# Patient Record
Sex: FEMALE | Race: OTHER | Employment: FULL TIME | URBAN - METROPOLITAN AREA
[De-identification: names, ages, dates, MRNs, and addresses within clinical notes are randomized per-mention and may not be internally consistent; named-entity substitution may affect disease eponyms.]

---

## 2024-10-03 ENCOUNTER — TELEPHONE (OUTPATIENT)
Age: 39
End: 2024-10-03

## 2024-10-03 NOTE — TELEPHONE ENCOUNTER
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

## 2024-10-04 ENCOUNTER — ROUTINE PRENATAL (OUTPATIENT)
Dept: OBGYN CLINIC | Facility: CLINIC | Age: 39
End: 2024-10-04
Payer: COMMERCIAL

## 2024-10-04 VITALS
SYSTOLIC BLOOD PRESSURE: 108 MMHG | BODY MASS INDEX: 24.38 KG/M2 | WEIGHT: 142.8 LBS | DIASTOLIC BLOOD PRESSURE: 60 MMHG | HEIGHT: 64 IN

## 2024-10-04 DIAGNOSIS — Z3A.30 30 WEEKS GESTATION OF PREGNANCY: ICD-10-CM

## 2024-10-04 DIAGNOSIS — O09.513 PRIMIGRAVIDA OF ADVANCED MATERNAL AGE IN THIRD TRIMESTER: ICD-10-CM

## 2024-10-04 DIAGNOSIS — Z23 ENCOUNTER FOR IMMUNIZATION: Primary | ICD-10-CM

## 2024-10-04 PROCEDURE — 90715 TDAP VACCINE 7 YRS/> IM: CPT | Performed by: STUDENT IN AN ORGANIZED HEALTH CARE EDUCATION/TRAINING PROGRAM

## 2024-10-04 PROCEDURE — 90471 IMMUNIZATION ADMIN: CPT | Performed by: STUDENT IN AN ORGANIZED HEALTH CARE EDUCATION/TRAINING PROGRAM

## 2024-10-04 PROCEDURE — PNV: Performed by: STUDENT IN AN ORGANIZED HEALTH CARE EDUCATION/TRAINING PROGRAM

## 2024-10-04 NOTE — PROGRESS NOTES
Assessment/Plan  Problem List Items Addressed This Visit       30 weeks gestation of pregnancy       Veronica presents today for her OB visit.  She is currently 30w2d    Vitals:    10/04/24 1417   BP: 108/60       She received her prenatal care in [facility].  Has records with her today, ultrasounds reviewed and scanned into chart.  Dated by first trimester ultrasound- RAGHU . Reports no complications this pregnancy.  Reports she completed  genetic testing but does not have results for review. Has first tri labs for review on phone.   Blood type O+  Urine cx neg   Neg HIV testing   Neg hep B antigen   Normal TSH       Flu vaccine completed prior     Pap smear and gonorrhea and chlamydia testing collected at this visit         Relevant Orders    Ambulatory Referral to Maternal Fetal Medicine    Type and screen    CBC and differential    RPR-Syphilis Screening (Total Syphilis IGG/IGM)    HIV 1/2 AG/AB w Reflex [facility] for 2 yr old and above    Liquid-based pap, screening    Chlamydia/GC amplified DNA by PCR    Urine culture    Glucose, 1H PG    Primigravida of advanced maternal age in third trimester     Other Visit Diagnoses       Encounter for immunization    -  Primary    Relevant Orders    Tdap Vaccine greater than or equal to 8yo            Subjective    Veronica is a 39 y.o. female,  with an Estimated Date of Delivery: 24 via 1st tri US. Patient reports no complaints. Fetal movement: active.     She is a PIERO from [facility]. Has first tri blood work and US available for review.   Reports no complications thus far this pregnancy, has been receiving routine care   Denies any medical problems     History  The following portions of the patient's history were reviewed and updated as appropriate: allergies, current medications, past family history, past medical history, past social history, past surgical history and problem list.    Objective  Vitals:    10/04/24 1417   BP: 108/60     Breast + pelvic exam wnl   FHT:  145  Urine: neg/neg  Pap will need to be recollected at next visit

## 2024-10-04 NOTE — ASSESSMENT & PLAN NOTE
Veronica presents today for her OB visit.  She is currently 30w2d    Vitals:    10/04/24 1417   BP: 108/60       She received her prenatal care in [facility].  Has records with her today, ultrasounds reviewed and scanned into chart.  Dated by first trimester ultrasound- RAGHU 12/11. Reports no complications this pregnancy.  Reports she completed  genetic testing but does not have results for review. Has first tri labs for review on phone.   Blood type O+  Urine cx neg   Neg HIV testing   Neg hep B antigen   Normal TSH       Flu vaccine completed prior     Pap smear and gonorrhea and chlamydia testing collected at this visit

## 2024-10-06 ENCOUNTER — APPOINTMENT (OUTPATIENT)
Dept: LAB | Facility: CLINIC | Age: 39
End: 2024-10-06
Payer: COMMERCIAL

## 2024-10-06 DIAGNOSIS — Z3A.30 30 WEEKS GESTATION OF PREGNANCY: ICD-10-CM

## 2024-10-06 LAB
ABO GROUP BLD: NORMAL
BASOPHILS # BLD AUTO: 0.03 THOUSANDS/ΜL (ref 0–0.1)
BASOPHILS NFR BLD AUTO: 0 % (ref 0–1)
BLD GP AB SCN SERPL QL: NEGATIVE
EOSINOPHIL # BLD AUTO: 0.16 THOUSAND/ΜL (ref 0–0.61)
EOSINOPHIL NFR BLD AUTO: 2 % (ref 0–6)
ERYTHROCYTE [DISTWIDTH] IN BLOOD BY AUTOMATED COUNT: 12.4 % (ref 11.6–15.1)
GLUCOSE 1H P 50 G GLC PO SERPL-MCNC: 92 MG/DL (ref 70–134)
HCT VFR BLD AUTO: 34.1 % (ref 34.8–46.1)
HGB BLD-MCNC: 11.2 G/DL (ref 11.5–15.4)
HIV 1+2 AB+HIV1 P24 AG SERPL QL IA: NORMAL
HIV 2 AB SERPL QL IA: NORMAL
HIV1 AB SERPL QL IA: NORMAL
HIV1 P24 AG SERPL QL IA: NORMAL
IMM GRANULOCYTES # BLD AUTO: 0.06 THOUSAND/UL (ref 0–0.2)
IMM GRANULOCYTES NFR BLD AUTO: 1 % (ref 0–2)
LYMPHOCYTES # BLD AUTO: 1.36 THOUSANDS/ΜL (ref 0.6–4.47)
LYMPHOCYTES NFR BLD AUTO: 14 % (ref 14–44)
MCH RBC QN AUTO: 32.7 PG (ref 26.8–34.3)
MCHC RBC AUTO-ENTMCNC: 32.8 G/DL (ref 31.4–37.4)
MCV RBC AUTO: 100 FL (ref 82–98)
MONOCYTES # BLD AUTO: 0.48 THOUSAND/ΜL (ref 0.17–1.22)
MONOCYTES NFR BLD AUTO: 5 % (ref 4–12)
NEUTROPHILS # BLD AUTO: 7.94 THOUSANDS/ΜL (ref 1.85–7.62)
NEUTS SEG NFR BLD AUTO: 78 % (ref 43–75)
NRBC BLD AUTO-RTO: 0 /100 WBCS
PLATELET # BLD AUTO: 221 THOUSANDS/UL (ref 149–390)
PMV BLD AUTO: 9 FL (ref 8.9–12.7)
RBC # BLD AUTO: 3.42 MILLION/UL (ref 3.81–5.12)
RH BLD: POSITIVE
SPECIMEN EXPIRATION DATE: NORMAL
TREPONEMA PALLIDUM IGG+IGM AB [PRESENCE] IN SERUM OR PLASMA BY IMMUNOASSAY: NORMAL
WBC # BLD AUTO: 10.03 THOUSAND/UL (ref 4.31–10.16)

## 2024-10-06 PROCEDURE — 87086 URINE CULTURE/COLONY COUNT: CPT

## 2024-10-06 PROCEDURE — 82950 GLUCOSE TEST: CPT

## 2024-10-06 PROCEDURE — 86901 BLOOD TYPING SEROLOGIC RH(D): CPT

## 2024-10-06 PROCEDURE — 86780 TREPONEMA PALLIDUM: CPT

## 2024-10-06 PROCEDURE — 86900 BLOOD TYPING SEROLOGIC ABO: CPT

## 2024-10-06 PROCEDURE — 87389 HIV-1 AG W/HIV-1&-2 AB AG IA: CPT

## 2024-10-06 PROCEDURE — 36415 COLL VENOUS BLD VENIPUNCTURE: CPT

## 2024-10-06 PROCEDURE — 85025 COMPLETE CBC W/AUTO DIFF WBC: CPT

## 2024-10-06 PROCEDURE — 86850 RBC ANTIBODY SCREEN: CPT

## 2024-10-07 DIAGNOSIS — Z3A.30 30 WEEKS GESTATION OF PREGNANCY: Primary | ICD-10-CM

## 2024-10-08 LAB — BACTERIA UR CULT: NORMAL

## 2024-10-14 NOTE — PROGRESS NOTES
OB/GYN  PN Visit  Veronica Pozo  [id number]  10/15/2024  2:15 PM  ELIE Carlin    S: 39 y.o.  31w6d here for PN visit. Pregnancy complicated by AMA and late transfer of care.     OB complaints:  Denies c/o n/v/ha, no edema, no smoking, no DV.   No vb/lof  No cramping/ctxns or signs of PTL.    She reports good FM. She reports vaginal discharge that has increased. Described as thick milk like substance. No odor.    O:    Pre- Vitals      Flowsheet Row Most Recent Value   Prenatal Assessment    Fetal Heart Rate 150   Fundal Height (cm) 31 cm   Movement Present   Prenatal Vitals    Blood Pressure 108/62   Weight - Scale 64.9 kg (143 lb)   Urine Albumin/Glucose    Dilation/Effacement/Station    Vaginal Drainage    Edema             Physical Exam  Genitourinary:     General: Normal vulva.      Exam position: Lithotomy position.      Pubic Area: No rash or pubic lice.       Labia:         Right: No rash or tenderness.         Left: No rash or tenderness.       Urethra: No urethral pain.      Vagina: Normal.      Cervix: Normal.      Uterus: Normal.       Adnexa: Right adnexa normal and left adnexa normal.        Right: No mass.          Left: No mass.     Lymphadenopathy:      Lower Body: No right inguinal adenopathy. No left inguinal adenopathy.         Gen: no acute distress, nonlabored breathing.  OB exam completed: fundal height, +FHT.  Urine: -/-    A/P:  Problem List Items Addressed This Visit       Primigravida of advanced maternal age in third trimester - Primary     Other Visit Diagnoses       Prenatal care in third trimester        Relevant Orders    Liquid-based pap, screening    Chlamydia/GC amplified DNA by PCR    Trichomonas vaginalis Thin prep    Genital Comprehensive Culture          #1. 31w6d GESTATION  Labor precautions reviewed  Fetal kick counts reviewed  Tdap: 10/4/24  Flu: has received this season in [place].  Rhogam: n/a  Blood type: O+  MFM  Oct 18.  Advised to complete hepatitis  blood work. She believes she had it done in [place], will review records.   Breastfeeding: has pump   Pediatrician: referral given  Contraception: NFP and condoms  GBS: at 36 weeks    - Third trimester packet provided and reviewed:   - Birth room rules and acknowledgement, birth plan, authorization for release of protected health information for photographers and birth certificate/SS worksheet to be brought to hospital at time of delivery.   -Delivery Consent signed today.    - FKC - 10 in 2 hours  -Perineal Massages to start at 34 weeks  -Last week of pregnancy and when to call      RTC in 2 weeks    ELIE Carlin  10/15/2024  2:15 PM

## 2024-10-15 ENCOUNTER — ROUTINE PRENATAL (OUTPATIENT)
Dept: OBGYN CLINIC | Facility: CLINIC | Age: 39
End: 2024-10-15

## 2024-10-15 VITALS
SYSTOLIC BLOOD PRESSURE: 108 MMHG | HEIGHT: 64 IN | WEIGHT: 143 LBS | BODY MASS INDEX: 24.41 KG/M2 | DIASTOLIC BLOOD PRESSURE: 62 MMHG

## 2024-10-15 DIAGNOSIS — O09.513 PRIMIGRAVIDA OF ADVANCED MATERNAL AGE IN THIRD TRIMESTER: Primary | ICD-10-CM

## 2024-10-15 DIAGNOSIS — Z34.93 PRENATAL CARE IN THIRD TRIMESTER: ICD-10-CM

## 2024-10-15 PROCEDURE — 87491 CHLMYD TRACH DNA AMP PROBE: CPT

## 2024-10-15 PROCEDURE — 87661 TRICHOMONAS VAGINALIS AMPLIF: CPT

## 2024-10-15 PROCEDURE — G0476 HPV COMBO ASSAY CA SCREEN: HCPCS

## 2024-10-15 PROCEDURE — 87070 CULTURE OTHR SPECIMN AEROBIC: CPT

## 2024-10-15 PROCEDURE — G0145 SCR C/V CYTO,THINLAYER,RESCR: HCPCS

## 2024-10-15 PROCEDURE — 87591 N.GONORRHOEAE DNA AMP PROB: CPT

## 2024-10-15 PROCEDURE — PNV

## 2024-10-16 ENCOUNTER — INITIAL PRENATAL (OUTPATIENT)
Dept: OBGYN CLINIC | Facility: CLINIC | Age: 39
End: 2024-10-16

## 2024-10-16 VITALS — BODY MASS INDEX: 24.48 KG/M2 | WEIGHT: 143.4 LBS | HEIGHT: 64 IN

## 2024-10-16 DIAGNOSIS — D56.9 THALASSEMIA, UNSPECIFIED TYPE: ICD-10-CM

## 2024-10-16 DIAGNOSIS — Z36.89 ENCOUNTER FOR OTHER SPECIFIED ANTENATAL SCREENING: Primary | ICD-10-CM

## 2024-10-16 DIAGNOSIS — Z34.03 ENCOUNTER FOR SUPERVISION OF NORMAL FIRST PREGNANCY IN THIRD TRIMESTER: ICD-10-CM

## 2024-10-16 PROBLEM — Z3A.32 32 WEEKS GESTATION OF PREGNANCY: Status: ACTIVE | Noted: 2024-10-04

## 2024-10-16 LAB
HPV HR 12 DNA CVX QL NAA+PROBE: NEGATIVE
HPV16 DNA CVX QL NAA+PROBE: NEGATIVE
HPV18 DNA CVX QL NAA+PROBE: NEGATIVE

## 2024-10-16 PROCEDURE — NOBC

## 2024-10-16 NOTE — PROGRESS NOTES
OB INTAKE INTERVIEW  Patient is 39 y.o. who presents for OB intake at 32 wks - TRANSFER FROM [facility]  She is accompanied by Giuliano during this encounter  The father of her baby (Giuliano Atkinson) is involved in the pregnancy and is ___ years old.      Last Menstrual Period: 3/11/2024  Ultrasound: Measured 12 weeks 5 days on 6/3/2024, patient also had US on 2024 c/w EDC 2024 (patient had US records)  Estimated Date of Delivery: 2024 confirmed by US    Signs/Symptoms of Pregnancy  Current pregnancy symptoms: (+) FM - discussed FKC, urinary frequency  Constipation no  Headaches no  Cramping/spotting no  PICA cravings no    Diabetes-  There is no height or weight on file to calculate BMI.  If patient has 1 or more, please order early 1 hour GTT  History of GDM no  BMI >35 no  History of PCOS or current metformin use no  History of LGA/macrosomic infant (4000g/9lbs) no    If patient has 2 or more, please order early 1 hour GTT  BMI>30 no  AMA YES  First degree relative with type 2 diabetes no  History of chronic HTN, hyperlipidemia, elevated A1C no  High risk race (, , ,  or ) YES    Hypertension- if you answer yes to any of the following, please order baseline preeclampsia labs (cbc, comprehensive metabolic panel, urine protein creatinine ratio, uric acid)  History of of chronic HTN no  History of gestational HTN no  History of preeclampsia, eclampsia, or HELLP syndrome no  History of diabetes no  History of lupus,sjogrens syndrome, kidney disease no    Thyroid- if yes order TSH with reflex T4  History of thyroid disease no    Bleeding Disorder or Hx of DVT-patient or first degree relative with history of. Order the following if not done previously.   (Factor V, antithrombin III, prothrombin gene mutation, protein C and S Ag, lupus anticoagulant, anticardiolipin, beta-2 glycoprotein)   no    OB/GYN-  History of abnormal pap  smear no       Date of last pap smear 10/15/2024  History of HPV no  History of Herpes/HSV no  History of other STI (gonorrhea, chlamydia, trich) no  History of prior  no  History of prior  no  History of  delivery prior to 36 weeks 6 days no  History of Varicella or Vaccination patient had chickenpox + vaccine  History of blood transfusion no  Ok for blood transfusion YES    Substance screening-   History of tobacco use no  Currently using tobacco no  Substance Use Screen Level (N/A, LOW, HIGH) N/A    MRSA Screening-   Does the pt have a hx of MRSA? no    Immunizations:  Influenza vaccine given this season YES  Discussed Tdap vaccine YES - had TDAP vaccine 10/4/2024  Discussed COVID Vaccine - had Covid vaccine x 2 + 1 booster, pt had Covid x 1     Genetic/MFM-  Do you or your partner have a history of any of the following in yourselves or first degree relatives?  Cystic fibrosis no  Spinal muscular atrophy no  Hemoglobinopathy/Sickle Cell/Thalassemia no  Fragile X Intellectual Disability no    If yes, discuss Carrier Screening and recommend consultation with [facility]/Genetic Counseling and place specific [facility] Referral for.    If no, discuss Carrier Screening being completed once in a lifetime as a standard of care lab test. Place orders for Cystic Fibrosis Gene Test (HHV759) and Spinal Muscular Atrophy DNA ([id number]) - discussed - patient will decide & inform if wants lab order      Appointment for Nuchal Translucency Ultrasound at [facility] scheduled for - has US appointment 10/18/2024 - had NT & cell free dna (wnl) & US wk 16, 21, 28      Interview education  [facility] Pregnancy Essentials Book reviewed, discussed and attached to their AVS YES    Nurse/Family Partnership- patient may qualify; referral placed NO    Prenatal lab work scripts - patient previously had labs done - active orders in chart for HBSAG & Hep C Ab  Extra labs ordered:  Hgb fractionation cascade, Rubella    Aspirin/Preeclampsia  Screen    Risk Level Risk Factor Recommendation   LOW Prior Uncomplicated full-term delivery no No Aspirin recommendation        MODERATE Nulliparity YES Recommend low-dose aspirin if     BMI>30 no 2 or more moderate risk factors    Family History Preeclampsia (mother/sister) no     35yr old or greater YES     Black Race, Concern for SDOH/Low Socioeconomic no     IVF Pregnancy  no     Personal History Risks (low birth weight, prior adverse preg outcome, >10yr preg interval) no         HIGH History of Preeclampsia no Recommend low-dose aspirin if     Multifetal gestation no 1 or more high risk factors    Chronic HTN no     Type 1 or 2 Diabetes no     Renal Disease no     Autoimmune Disease  no      Contraindications to ASA therapy:  NSAID/ ASA allergy: no  Nasal polyps: no  Asthma with history of ASA induced bronchospasm: no  Relative contraindications:  History of GI bleed: no  Active peptic ulcer disease: no  Severe hepatic dysfunction: no    Patient should be recommended to take ASA 162mg during this pregnancy from 12-36wks to lower her risk of preeclampsia: not taking LDASA      The patient has a history now or in prior pregnancy notable for:  - transfer from [facility] @ 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 The patient was oriented to our practice, the navigator role, reviewed delivering physicians and [facility] for Delivery. All questions were answered.    Interviewed by: MARYANN Meraz, RN

## 2024-10-17 LAB
C TRACH DNA SPEC QL NAA+PROBE: NEGATIVE
N GONORRHOEA DNA SPEC QL NAA+PROBE: NEGATIVE
T VAGINALIS DNA SPEC QL NAA+PROBE: NEGATIVE

## 2024-10-18 ENCOUNTER — ROUTINE PRENATAL (OUTPATIENT)
Dept: PERINATAL CARE | Facility: CLINIC | Age: 39
End: 2024-10-18
Payer: COMMERCIAL

## 2024-10-18 VITALS
SYSTOLIC BLOOD PRESSURE: 106 MMHG | HEART RATE: 84 BPM | WEIGHT: 143.8 LBS | DIASTOLIC BLOOD PRESSURE: 60 MMHG | HEIGHT: 64 IN | OXYGEN SATURATION: 98 % | BODY MASS INDEX: 24.55 KG/M2

## 2024-10-18 DIAGNOSIS — O09.513 PRIMIGRAVIDA OF ADVANCED MATERNAL AGE IN THIRD TRIMESTER: Primary | ICD-10-CM

## 2024-10-18 DIAGNOSIS — Z3A.30 30 WEEKS GESTATION OF PREGNANCY: ICD-10-CM

## 2024-10-18 LAB — BACTERIA GENITAL AEROBE CULT: NORMAL

## 2024-10-18 PROCEDURE — 99203 OFFICE O/P NEW LOW 30 MIN: CPT | Performed by: OBSTETRICS & GYNECOLOGY

## 2024-10-18 NOTE — LETTER
"   Date: 10/18/2024    Daily Wilkins MD  [address] [id number]    Patient: Veronica Pozo   YOB: 1985   Date of Visit: 10/18/2024   Gestational age 32w2d   Nature of this communication: Priority: Patient is scheduled to see you 10/30/24 for a prenatal visit. I recommended weekly NST+TEJINDER to begin between 32-36 weeks and continue through delivery. She wants to complete this in your office if possible. Can you please address at her upcoming visit, or have her schedule with M if needed? I recommend delivery by due date for AMA. Thanks!       This patient was seen recently in our  office.  Please see ultrasound report under \"OB Procedures\" tab.  Please don't hesitate to contact our office with any concerns or questions.      Sincerely,      Sachi Mac MD  Attending Physician, Maternal-Fetal Medicine  [facility]    "

## 2024-10-18 NOTE — LETTER
"2024     Luz Burrell MD  [address] [id number]    Patient: Veronica Pozo   YOB: 1985   Date of Visit: 10/18/2024       Dear Dr. Burrell:    Thank you for referring Veronica Pozo to me for evaluation. Below are my notes for this consultation.    If you have questions, please do not hesitate to call me. I look forward to following your patient along with you.         Sincerely,        Sachi Mac MD        CC: No Recipients    Sachi Mac MD  10/18/2024  9:30 AM  Sign when Signing Visit  [facility]: Ms. Pozo was seen today at 32w2d for anatomic survey ultrasound.  See ultrasound report under \"OB Procedures\" tab.      My recommendations are as follows:  Although encouraging, even a normal-appearing ultrasound cannot exclude all malformations, or the possibility of a genetic syndrome.  No further ultrasounds were scheduled at this time. Please refer her back to our office as clinically indicated.      Based on maternal age I recommend weekly antepartum surveillance (NST+TEJINDER) to begin between 32 and 36 weeks and continue until delivery, in addition to kick counting. She prefers that this be completed with [facility] if possible. Delivery is recommended by her due date.      Please don't hesitate to contact our office with any concerns or questions.    -Sachi Mac MD  "

## 2024-10-18 NOTE — PROGRESS NOTES
"[facility]: Ms. Pozo was seen today at 32w2d for anatomic survey ultrasound.  See ultrasound report under \"OB Procedures\" tab.      My recommendations are as follows:  Although encouraging, even a normal-appearing ultrasound cannot exclude all malformations, or the possibility of a genetic syndrome.  No further ultrasounds were scheduled at this time. Please refer her back to our office as clinically indicated.      Based on maternal age I recommend weekly antepartum surveillance (NST+TEJINDER) to begin between 32 and 36 weeks and continue until delivery, in addition to kick counting. She prefers that this be completed with [facility] if possible. Delivery is recommended by her due date.      Please don't hesitate to contact our office with any concerns or questions.    -Sachi Mac MD  "

## 2024-10-22 LAB
LAB AP GYN PRIMARY INTERPRETATION: NORMAL
LAB AP LMP: NORMAL
Lab: NORMAL

## 2024-10-30 ENCOUNTER — ROUTINE PRENATAL (OUTPATIENT)
Dept: OBGYN CLINIC | Facility: CLINIC | Age: 39
End: 2024-10-30
Payer: COMMERCIAL

## 2024-10-30 VITALS
SYSTOLIC BLOOD PRESSURE: 100 MMHG | HEIGHT: 64 IN | BODY MASS INDEX: 24.62 KG/M2 | WEIGHT: 144.2 LBS | DIASTOLIC BLOOD PRESSURE: 60 MMHG

## 2024-10-30 DIAGNOSIS — Z29.11 NEED FOR RSV IMMUNIZATION: ICD-10-CM

## 2024-10-30 DIAGNOSIS — Z34.93 PRENATAL CARE IN THIRD TRIMESTER: Primary | ICD-10-CM

## 2024-10-30 PROCEDURE — PNV: Performed by: OBSTETRICS & GYNECOLOGY

## 2024-10-30 PROCEDURE — 90678 RSV VACC PREF BIVALENT IM: CPT | Performed by: OBSTETRICS & GYNECOLOGY

## 2024-10-30 PROCEDURE — 90471 IMMUNIZATION ADMIN: CPT | Performed by: OBSTETRICS & GYNECOLOGY

## 2024-10-30 NOTE — PROGRESS NOTES
Patient reports good fm, no n/v, headache, cramping, bleeding, loss of fluid, edema, dom violence, or smoking.  nancy pnv, urine negative negative  -Had Tdap and flu, RSV today counseled  -Will schedule antepartum fetal surveillance weekly NST TEJINDER for AMA  -Pediatrician has referral  -has breastpump  -has yellow Pack  -Contraception: NFP and condoms  Return in 1 week for nst/tejinder

## 2024-11-06 ENCOUNTER — CLINICAL SUPPORT (OUTPATIENT)
Dept: POSTPARTUM | Facility: CLINIC | Age: 39
End: 2024-11-06

## 2024-11-06 DIAGNOSIS — Z32.2 ENCOUNTER FOR CHILDBIRTH INSTRUCTION: Primary | ICD-10-CM

## 2024-11-12 ENCOUNTER — ULTRASOUND (OUTPATIENT)
Dept: OBGYN CLINIC | Facility: CLINIC | Age: 39
End: 2024-11-12

## 2024-11-12 VITALS
DIASTOLIC BLOOD PRESSURE: 60 MMHG | HEIGHT: 64 IN | BODY MASS INDEX: 25.16 KG/M2 | SYSTOLIC BLOOD PRESSURE: 100 MMHG | WEIGHT: 147.4 LBS

## 2024-11-12 DIAGNOSIS — Z3A.35 35 WEEKS GESTATION OF PREGNANCY: Primary | ICD-10-CM

## 2024-11-12 PROCEDURE — PNV: Performed by: STUDENT IN AN ORGANIZED HEALTH CARE EDUCATION/TRAINING PROGRAM

## 2024-11-12 NOTE — PROGRESS NOTES
Veronica is a 39 y.o.  35w6d. Reports ++FM, no LOF, VB, or regular contractions.     Vitals:    24 1300   BP: 100/60     S=D  +FHTs  TAUS: VERTEX, TEJINDER 11  Assessment & Plan  35 weeks gestation of pregnancy  Rh status POS  Growth 10/18/2024: VERTEX, EFW Hadlock 4 2048 grams - 4 lbs 8 oz (55%), AC 68th%ile  TDAP: received  RSV: received  Peds: TBD  Contraception:  Discussed pre-term labor precautions  Return to office in 1 weeks         Multigravida of advanced maternal age in third trimester  APFS recommended starting at 32-36 weeks  Had been scheduled for NST+TEJINDER today, but was uncertain about recommendation, declined to be put on NST today  Reviewed that advanced maternal age confers higher risk of fetal distress and stillbirth and as such recommend NST and TEJINDER to capture any signs of distress  Indicate preference to start next week, unable to do today  TAUS 24: VERTEX, TEJINDER 11  No NST preformed due to patient preference

## 2024-11-13 ENCOUNTER — CLINICAL SUPPORT (OUTPATIENT)
Dept: POSTPARTUM | Facility: CLINIC | Age: 39
End: 2024-11-13

## 2024-11-13 DIAGNOSIS — Z32.2 ENCOUNTER FOR CHILDBIRTH INSTRUCTION: Primary | ICD-10-CM

## 2024-11-16 ENCOUNTER — CLINICAL SUPPORT (OUTPATIENT)
Age: 39
End: 2024-11-16

## 2024-11-16 DIAGNOSIS — Z32.2 ENCOUNTER FOR CHILDBIRTH INSTRUCTION: Primary | ICD-10-CM

## 2024-11-19 ENCOUNTER — ROUTINE PRENATAL (OUTPATIENT)
Dept: OBGYN CLINIC | Facility: CLINIC | Age: 39
End: 2024-11-19

## 2024-11-19 ENCOUNTER — ULTRASOUND (OUTPATIENT)
Dept: OBGYN CLINIC | Facility: CLINIC | Age: 39
End: 2024-11-19

## 2024-11-19 VITALS
HEART RATE: 86 BPM | BODY MASS INDEX: 25.3 KG/M2 | DIASTOLIC BLOOD PRESSURE: 60 MMHG | SYSTOLIC BLOOD PRESSURE: 110 MMHG | HEIGHT: 64 IN

## 2024-11-19 VITALS — BODY MASS INDEX: 25.3 KG/M2 | HEIGHT: 64 IN

## 2024-11-19 DIAGNOSIS — Z33.1 NORMAL PREGNANCY, INCIDENTAL: Primary | ICD-10-CM

## 2024-11-19 DIAGNOSIS — Z34.93 PRENATAL CARE IN THIRD TRIMESTER: Primary | ICD-10-CM

## 2024-11-19 PROCEDURE — PBNCHG PB NO CHARGE PLACEHOLDER: Performed by: OBSTETRICS & GYNECOLOGY

## 2024-11-19 NOTE — PROGRESS NOTES
Patient reports good fm, no n/v, headache, cramping, bleeding, loss of fluid, edema, dom violence, or smoking.  nancy pnv urine negative negative  -NST 20 minutes 140s with moderate variability and accelerations no decelerations toco irritability, TEJINDER 11.5  -Patient had Tdap, RSV, flu shot  -Had peds consult and yellow packet  -Contraception NFP and condoms  -Continue weekly NST TEJINDER  -GBS done today

## 2024-11-20 ENCOUNTER — CLINICAL SUPPORT (OUTPATIENT)
Dept: POSTPARTUM | Facility: CLINIC | Age: 39
End: 2024-11-20

## 2024-11-20 DIAGNOSIS — Z32.2 ENCOUNTER FOR CHILDBIRTH INSTRUCTION: Primary | ICD-10-CM

## 2024-11-26 ENCOUNTER — ROUTINE PRENATAL (OUTPATIENT)
Dept: OBGYN CLINIC | Facility: CLINIC | Age: 39
End: 2024-11-26
Payer: COMMERCIAL

## 2024-11-26 ENCOUNTER — ULTRASOUND (OUTPATIENT)
Dept: OBGYN CLINIC | Facility: CLINIC | Age: 39
End: 2024-11-26
Payer: COMMERCIAL

## 2024-11-26 VITALS
DIASTOLIC BLOOD PRESSURE: 56 MMHG | HEIGHT: 64 IN | WEIGHT: 149 LBS | BODY MASS INDEX: 25.44 KG/M2 | SYSTOLIC BLOOD PRESSURE: 112 MMHG

## 2024-11-26 DIAGNOSIS — O09.513 PRIMIGRAVIDA OF ADVANCED MATERNAL AGE IN THIRD TRIMESTER: Primary | ICD-10-CM

## 2024-11-26 DIAGNOSIS — Z33.1 NORMAL PREGNANCY, INCIDENTAL: Primary | ICD-10-CM

## 2024-11-26 PROCEDURE — 59025 FETAL NON-STRESS TEST: CPT | Performed by: STUDENT IN AN ORGANIZED HEALTH CARE EDUCATION/TRAINING PROGRAM

## 2024-11-26 PROCEDURE — PNV: Performed by: STUDENT IN AN ORGANIZED HEALTH CARE EDUCATION/TRAINING PROGRAM

## 2024-11-26 PROCEDURE — 76815 OB US LIMITED FETUS(S): CPT | Performed by: STUDENT IN AN ORGANIZED HEALTH CARE EDUCATION/TRAINING PROGRAM

## 2024-11-26 NOTE — PROGRESS NOTES
Veronica is a 40 yo  at 37 weeks and 6 days presenting for routine PNC- she denies CTX, LOF or VB. She endorses good FM. Urine neg/neg.  Pregnancy is complicated by AMA.  NST today in office was reactive with a baseline of, multiple 15x15 accelerations and no decelerations with irregular contractions on tocometer.   TEJINDER: 11.01 cm     We discussed recommendations for delivery by due date and as early as 39 weeks for elective IOL.  Cervical team today is FT/ 50/-1, soft and posterior.  Labor talk completed- they do not have a car or car seat and we reviewed social work consultation and ability to rent a car seat to get their baby home.  RTO in 1 week

## 2024-12-03 ENCOUNTER — TELEPHONE (OUTPATIENT)
Dept: OBGYN CLINIC | Facility: CLINIC | Age: 39
End: 2024-12-03

## 2024-12-03 ENCOUNTER — ULTRASOUND (OUTPATIENT)
Dept: OBGYN CLINIC | Facility: CLINIC | Age: 39
End: 2024-12-03
Payer: COMMERCIAL

## 2024-12-03 VITALS — BODY MASS INDEX: 26.09 KG/M2 | SYSTOLIC BLOOD PRESSURE: 100 MMHG | WEIGHT: 152 LBS | DIASTOLIC BLOOD PRESSURE: 60 MMHG

## 2024-12-03 DIAGNOSIS — Z34.93 PRENATAL CARE IN THIRD TRIMESTER: Primary | ICD-10-CM

## 2024-12-03 PROCEDURE — 76815 OB US LIMITED FETUS(S): CPT | Performed by: OBSTETRICS & GYNECOLOGY

## 2024-12-03 PROCEDURE — 59025 FETAL NON-STRESS TEST: CPT | Performed by: OBSTETRICS & GYNECOLOGY

## 2024-12-03 PROCEDURE — PNV: Performed by: OBSTETRICS & GYNECOLOGY

## 2024-12-03 NOTE — TELEPHONE ENCOUNTER
----- Message from Daily Wilkins MD sent at 12/3/2024  1:39 PM EST -----  Regarding: IOL  Please schedule 2 day induction with 8 pm ripening 12/12 hill/cytotec and oxytocin next morning 12/13.  Pnc recommendation for AMA

## 2024-12-03 NOTE — TELEPHONE ENCOUNTER
[facility] message sent to Danisha SCHULER to check availability for IOL 12/12/2024 @ 8pm into 12/13/2024 - available & patient scheduled.  Pink sticky note updated.  LM patient's VM to recall office to inform of induction date & instructions.  Staff message sent to Dr Wilkins & Dr Christy.   Yes

## 2024-12-03 NOTE — PROGRESS NOTES
Patient reports good fm, no n/v, headache,  bleeding, loss of fluid, edema, dom violence, or smoking.  nancy pnv occasional cramping, some edema  -NST 20-minute 130s moderate variability with accelerations no decelerations  Snyder q 5 min contractions, patient not feeling  -Patient had Tdap, RSV, flu shot  -Had peds consult and yellow packet  -Weekly NST TEJINDER for AMA  -contraception NFP and condoms  -GBS negative, will need car seat and social work consult to assist at hospital  -Message sent to staff patient and  willing for IOL December 12 into the 13th.  Patient preferred to wait as long as possible to allow natural labor to hopefully occur  -Return in 1 week or sooner as needed

## 2024-12-10 ENCOUNTER — TELEPHONE (OUTPATIENT)
Age: 39
End: 2024-12-10

## 2024-12-10 ENCOUNTER — TELEPHONE (OUTPATIENT)
Dept: OBGYN CLINIC | Facility: CLINIC | Age: 39
End: 2024-12-10

## 2024-12-10 ENCOUNTER — ROUTINE PRENATAL (OUTPATIENT)
Dept: OBGYN CLINIC | Facility: CLINIC | Age: 39
End: 2024-12-10
Payer: COMMERCIAL

## 2024-12-10 ENCOUNTER — ULTRASOUND (OUTPATIENT)
Dept: OBGYN CLINIC | Facility: CLINIC | Age: 39
End: 2024-12-10
Payer: COMMERCIAL

## 2024-12-10 VITALS
WEIGHT: 152.8 LBS | SYSTOLIC BLOOD PRESSURE: 110 MMHG | HEART RATE: 78 BPM | HEIGHT: 64 IN | DIASTOLIC BLOOD PRESSURE: 64 MMHG | BODY MASS INDEX: 26.09 KG/M2

## 2024-12-10 DIAGNOSIS — O09.513 PRIMIGRAVIDA OF ADVANCED MATERNAL AGE IN THIRD TRIMESTER: ICD-10-CM

## 2024-12-10 DIAGNOSIS — Z34.93 PRENATAL CARE IN THIRD TRIMESTER: Primary | ICD-10-CM

## 2024-12-10 PROCEDURE — PNV: Performed by: OBSTETRICS & GYNECOLOGY

## 2024-12-10 PROCEDURE — 76815 OB US LIMITED FETUS(S): CPT | Performed by: OBSTETRICS & GYNECOLOGY

## 2024-12-10 PROCEDURE — 59025 FETAL NON-STRESS TEST: CPT | Performed by: OBSTETRICS & GYNECOLOGY

## 2024-12-10 PROCEDURE — PBNCHG PB NO CHARGE PLACEHOLDER: Performed by: OBSTETRICS & GYNECOLOGY

## 2024-12-10 NOTE — PROGRESS NOTES
Patient reports good fm, no n/v, headache,  bleeding, loss of fluid, edema, dom violence, or smoking.  nancy pnv some occasional cramping urine negative negative  -NST greater than 20 minutes 130s with moderate variability there was a paper jam and some monitoring was lost but no decelerations noted and accelerations were noted toco still with irritability patient not feeling  -TEJINDER 14 Vertex  Assessment & Plan  Prenatal care in third trimester  -Patient had Tdap, RSV, flu shot  -Had peds consult and yellow packet  -Contraception NFP and condoms  -Continue weekly NST TEJINDER  they do not have a car or car seat and we reviewed social work consultation and ability to rent a car seat to get their baby home.              Primigravida of advanced maternal age in third trimester  Weekly NST TEJINDER for AMA  Patient had IOL scheduled for December 12 into the 13th.  Patient would like to delay to 15th into the 16th.  We discussed that recommendations for AMA are by EDC we discussed that placenta continues to age on a day by day basis and that while monitoring is reassuring at this time that there is increased risk for baby to not tolerate labor as well as we continue to move forward to mature we discussed recommendation would be for delivery by EDC patient would like to look into availability of IOL for 15th end of the 16th with repeat monitoring on Friday.  Message sent to staff to evaluate.

## 2024-12-10 NOTE — TELEPHONE ENCOUNTER
Rescheduled patient's IOL to 12/15/2024 @ 8 pm into 12/16/2024.  Pink sticky note updated.  Patient informed.  Staff message sent to Dr Waggoner & Dr Burrell. Called thr Access center ([place]) - [facility] not answering - will send message re: appointment for NST &TEJINDER on 12/13/2024.  Patient updated.  Will recall patient 12/11/2024.

## 2024-12-10 NOTE — TELEPHONE ENCOUNTER
----- Message from Fidelia Waggoner MD sent at 12/10/2024  1:16 PM EST -----  Regarding: RE: not sure if you got mesg before about r/s IOL to 12/15 @ 8 pm into 12/16 - saw Dr Wilkins today. ok with you for 12/15 (Sun)  I am fine with it- saw she was counseled by Dr. Wilkins.  ----- Message -----  From: Audrey Meraz RN  Sent: 12/10/2024  12:54 PM EST  To: Fidelia Waggoner MD  Subject: not sure if you got mesg before about r/s IO#

## 2024-12-10 NOTE — TELEPHONE ENCOUNTER
Audrey from [facility] called in regards to getting the patient an NST/TEJINDER appointment for Friday 12/13/24  at the [place] location per Dr. Wilkins. You can reach Audrey at the [facility] tomorrow at   [phone]

## 2024-12-10 NOTE — ASSESSMENT & PLAN NOTE
Weekly NST TEJINDER for AMA  Patient had IOL scheduled for December 12 into the 13th.  Patient would like to delay to 15th into the 16th.  We discussed that recommendations for AMA are by EDC we discussed that placenta continues to age on a day by day basis and that while monitoring is reassuring at this time that there is increased risk for baby to not tolerate labor as well as we continue to move forward to mature we discussed recommendation would be for delivery by EDC patient would like to look into availability of IOL for 15th end of the 16th with repeat monitoring on Friday.  Message sent to staff to evaluate.

## 2024-12-11 ENCOUNTER — TELEPHONE (OUTPATIENT)
Dept: PERINATAL CARE | Facility: CLINIC | Age: 39
End: 2024-12-11

## 2024-12-11 NOTE — TELEPHONE ENCOUNTER
Called [facility] ([place] - [phone]) - spoke with Angela - scheduled NST & TEJINDER for 12/13/2024 @ 3:15 pm.  Left message patient's VM & MyChart message sent to patient.

## 2024-12-11 NOTE — TELEPHONE ENCOUNTER
received call from Audrey from [facility] to schedule patient for nst/naya due to iol being postponed until 12/15. miguelina 12/11. scheduled for 12/13 in [place] at 3:15pm

## 2024-12-11 NOTE — TELEPHONE ENCOUNTER
Patient's partner, Giuliano, called regarding appt info for MFM NST/ TEJINDER. Giuliano not listed on last 2 medical communication consent forms found on chart. Pt unavailable to s/w but will call back shortly to provide appt details.

## 2024-12-12 NOTE — PATIENT INSTRUCTIONS
Thank you for choosing us for your  care today.  If you have any questions about your ultrasound or care, please do not hesitate to contact us or your primary obstetrician.        Some general instructions for your pregnancy are:    Exercise: Aim for 150 minutes per week of regular exercise.  Walking is great!  Nutrition: Choose healthy sources of calcium, iron, and protein.  Avoid ultraprocessed foods and added sugar.  Learn about Preeclampsia: preeclampsia is a common, potentially serious high blood pressure complication in pregnancy.  A blood pressure of 140mmHg (systolic or top number) or 90mmHg (diastolic or bottom number) should be evaluated by your doctor.  Aspirin is sometimes prescribed in early pregnancy to prevent preeclampsia in women with risk factors - ask your obstetrician if you should be on this medication.  For more resources, visit:  https://www.highriskpregnancyinfo.org/preeclampsia  If you smoke, please try to quit completely but also try to reduce your smoking by as much as possible (as soon as possible).  Do not vape.  Please also avoid cannabis products.  Other warning signs to watch out for in pregnancy or postpartum: chest pain, obstructed breathing or shortness of breath, seizures, thoughts of hurting yourself or your baby, bleeding, a painful or swollen leg, fever, or headache ([place] [facility] Sign[place]  If these happen call 911.  Itching is also not normal in pregnancy and if you experience this, especially over your hands and feet, potentially worse at night, notify your doctors.     Kick Counts in Pregnancy   AMBULATORY CARE:   Kick counts  measure how much your baby is moving in your womb. A kick from your baby can be felt as a twist, turn, swish, roll, or jab. It is common to feel your baby kicking at 26 to 28 weeks of pregnancy. You may feel your baby kick as early as 20 weeks of pregnancy. You may want to start counting at 28 weeks.   Contact your  doctor immediately if:   You feel a change in the number of kicks or movements of your baby.      You feel fewer than 10 kicks within 2 hours.      You have questions or concerns about your baby's movements.     Why measure kick counts:  Your baby's movement may provide information about your baby's health. He or she may move less, or not at all, if there are problems. Your baby may move less if he or she is not getting enough oxygen or nutrition from the placenta. Do not smoke while you are pregnant. Smoking decreases the amount of oxygen that gets to your baby. Talk to your healthcare provider if you need help to quit smoking. Tell your healthcare provider as soon as you feel a change in your baby's movements.  When to measure kick counts:   Measure kick counts at the same time every day.       Measure kick counts when your baby is awake and most active. Your baby may be most active in the evening.     How to measure kick counts:  Check that your baby is awake before you measure kick counts. You can wake up your baby by lightly pushing on your belly, walking, or drinking something cold. Your healthcare provider may tell you different ways to measure kick counts. You may be told to do the following:  Use a chart or clock to keep track of the time you start and finish counting.      Sit in a chair or lie on your left side.      Place your hands on the largest part of your belly.      Count until you reach 10 kicks. Write down how much time it takes to count 10 kicks.      It may take 30 minutes to 2 hours to count 10 kicks. It should not take more than 2 hours to count 10 kicks.     Follow up with your doctor as directed:  Write down your questions so you remember to ask them during your visits.   © Copyright [employer] 2023 Information is for End User's use only and may not be sold, redistributed or otherwise used for commercial purposes.  The above information is an  only. It is not intended as  medical advice for individual conditions or treatments. Talk to your doctor, nurse or pharmacist before following any medical regimen to see if it is safe and effective for you. Nonstress Test for Pregnancy   WHAT YOU NEED TO KNOW:   What do I need to know about a nonstress test?  A nonstress test measures your baby's heart rate and movements. Nonstress means that no stress will be placed on your baby during the test.  How do I prepare for a nonstress test?  Your healthcare provider will talk to you about how to prepare for this test. Your provider may tell you to eat and drink plenty of liquids before your test. If you smoke, you may be asked not to smoke within 2 hours before the test. Your provider will also tell you which medicines to take or not take on the day of your test.  What will happen during a nonstress test?  You may be asked to lie down or recline back for the test on a bed. One or 2 belts with sensors will be placed around your abdomen. Your baby's heart rate will be recorded with a machine. If your baby does not move, your baby may be asleep. Your healthcare provider may make a noise near your abdomen to try to wake your baby. The test usually takes about 20 minutes, but can take longer if your baby needs to be awakened.        What do I need to know about the test results?  Your baby will be expected to move at least 2 times for a certain amount of time. Your baby's heart rate will be expected to go up by a certain number of beats per minute during movement. If your baby does not move as expected, the test may need to be repeated or you may need other tests.  CARE AGREEMENT:   You have the right to help plan your care. Learn about your health condition and how it may be treated. Discuss treatment options with your healthcare providers to decide what care you want to receive. You always have the right to refuse treatment. The above information is an  only. It is not intended as  medical advice for individual conditions or treatments. Talk to your doctor, nurse or pharmacist before following any medical regimen to see if it is safe and effective for you.  © 2023 Information is for End User's use only and may not be sold, redistributed or otherwise used for commercial purposes. Thank you for choosing us for your  care today.  If you have any questions about your ultrasound or care, please do not hesitate to contact us or your primary obstetrician.        Some general instructions for your pregnancy are:    Exercise: Aim for 150 minutes per week of regular exercise.  Walking is great!  Nutrition: Choose healthy sources of calcium, iron, and protein.  Avoid ultraprocessed foods and added sugar.  Learn about Preeclampsia: preeclampsia is a common, potentially serious high blood pressure complication in pregnancy.  A blood pressure of 140mmHg (systolic or top number) or 90mmHg (diastolic or bottom number) should be evaluated by your doctor.  Aspirin is sometimes prescribed in early pregnancy to prevent preeclampsia in women with risk factors - ask your obstetrician if you should be on this medication.  For more resources, visit:  https://www.highriskpregnancyinfo.org/preeclampsia  If you smoke, please try to quit completely but also try to reduce your smoking by as much as possible (as soon as possible).  Do not vape.  Please also avoid cannabis products.  Other warning signs to watch out for in pregnancy or postpartum: chest pain, obstructed breathing or shortness of breath, seizures, thoughts of hurting yourself or your baby, bleeding, a painful or swollen leg, fever, or headache (see [facility] POST-BIRTH Warning Signs campaign).  If these happen call 911.  Itching is also not normal in pregnancy and if you experience this, especially over your hands and feet, potentially worse at night, notify your doctors.

## 2024-12-13 ENCOUNTER — ULTRASOUND (OUTPATIENT)
Dept: PERINATAL CARE | Facility: CLINIC | Age: 39
End: 2024-12-13
Payer: COMMERCIAL

## 2024-12-13 VITALS
HEART RATE: 102 BPM | HEIGHT: 64 IN | BODY MASS INDEX: 26.12 KG/M2 | SYSTOLIC BLOOD PRESSURE: 120 MMHG | WEIGHT: 153 LBS | DIASTOLIC BLOOD PRESSURE: 72 MMHG

## 2024-12-13 DIAGNOSIS — O09.513 PRIMIGRAVIDA OF ADVANCED MATERNAL AGE IN THIRD TRIMESTER: ICD-10-CM

## 2024-12-13 DIAGNOSIS — Z3A.40 40 WEEKS GESTATION OF PREGNANCY: Primary | ICD-10-CM

## 2024-12-13 PROCEDURE — 59025 FETAL NON-STRESS TEST: CPT | Performed by: STUDENT IN AN ORGANIZED HEALTH CARE EDUCATION/TRAINING PROGRAM

## 2024-12-13 PROCEDURE — 76815 OB US LIMITED FETUS(S): CPT | Performed by: STUDENT IN AN ORGANIZED HEALTH CARE EDUCATION/TRAINING PROGRAM

## 2024-12-13 NOTE — PROGRESS NOTES
This patient received  care under my supervision on 24 at 40w2d gestational age at [facility].  NST is reactive.  -Delaney Beltran MD

## 2024-12-13 NOTE — PROGRESS NOTES
Non-Stress Testing:    Non-Stress test, equipment, procedure, and expected outcomes explained. Reviewed fetal kick counts and when to call OB.Verified patient understanding of fetal kick counts with teach back method. Patient reports feeling daily fetal movements. Patient has no questions or concerns.     Reviewed non-stress test with Dr. Beltran.

## 2024-12-13 NOTE — LETTER
NST sleeve cover sheet    Patient name: Veronica Pozo  : 1985  MRN: [id number]    RAGHU: Estimated Date of Delivery: 24    Obstetrician: GAYLE    Reason(s) for testing: AMA 38 yo    Testing frequency:    ___  2x/wk  _X__ 1x/wk  ___  Dopplers  ___  BPP?      Last growth scan: __________, _________, _________    Baby:      Male   /   Female   /   [place]             Baby Name: ___________________            IOL or  C/S: _____________________

## 2024-12-14 ENCOUNTER — TELEPHONE (OUTPATIENT)
Dept: OTHER | Facility: OTHER | Age: 39
End: 2024-12-14

## 2024-12-14 NOTE — TELEPHONE ENCOUNTER
Patient is calling regarding cancelling an appointment.    Date/Time: 12/15/2024 8:00 pm     Patient was rescheduled: YES [] NO [x]    Patient requesting call back to reschedule: YES [x] NO []    Paged on call VIA EPIC

## 2024-12-16 ENCOUNTER — ANESTHESIA (INPATIENT)
Dept: ANESTHESIOLOGY | Facility: HOSPITAL | Age: 39
End: 2024-12-16
Payer: COMMERCIAL

## 2024-12-16 ENCOUNTER — ANESTHESIA EVENT (INPATIENT)
Dept: ANESTHESIOLOGY | Facility: HOSPITAL | Age: 39
End: 2024-12-16
Payer: COMMERCIAL

## 2024-12-16 ENCOUNTER — HOSPITAL ENCOUNTER (INPATIENT)
Facility: HOSPITAL | Age: 39
LOS: 3 days | Discharge: HOME | End: 2024-12-19
Attending: STUDENT IN AN ORGANIZED HEALTH CARE EDUCATION/TRAINING PROGRAM | Admitting: STUDENT IN AN ORGANIZED HEALTH CARE EDUCATION/TRAINING PROGRAM
Payer: COMMERCIAL

## 2024-12-16 ENCOUNTER — ULTRASOUND (OUTPATIENT)
Dept: PERINATAL CARE | Facility: CLINIC | Age: 39
End: 2024-12-16
Payer: COMMERCIAL

## 2024-12-16 VITALS
SYSTOLIC BLOOD PRESSURE: 106 MMHG | WEIGHT: 154 LBS | BODY MASS INDEX: 26.29 KG/M2 | DIASTOLIC BLOOD PRESSURE: 80 MMHG | HEIGHT: 64 IN | HEART RATE: 64 BPM

## 2024-12-16 DIAGNOSIS — Z3A.40 40 WEEKS GESTATION OF PREGNANCY: ICD-10-CM

## 2024-12-16 DIAGNOSIS — O09.513 PRIMIGRAVIDA OF ADVANCED MATERNAL AGE IN THIRD TRIMESTER: ICD-10-CM

## 2024-12-16 DIAGNOSIS — O09.513 PRIMIGRAVIDA OF ADVANCED MATERNAL AGE IN THIRD TRIMESTER: Primary | ICD-10-CM

## 2024-12-16 PROBLEM — O42.90 LEAKAGE OF AMNIOTIC FLUID: Status: ACTIVE | Noted: 2024-12-16

## 2024-12-16 PROBLEM — Z34.90 ENCOUNTER FOR INDUCTION OF LABOR: Status: ACTIVE | Noted: 2024-12-16

## 2024-12-16 PROBLEM — O36.8390 NON-REASSURING ELECTRONIC FETAL MONITORING TRACING: Status: ACTIVE | Noted: 2024-12-16

## 2024-12-16 PROCEDURE — 76817 TRANSVAGINAL US OBSTETRIC: CPT | Performed by: OBSTETRICS & GYNECOLOGY

## 2024-12-16 PROCEDURE — 85025 COMPLETE CBC W/AUTO DIFF WBC: CPT

## 2024-12-16 PROCEDURE — 99214 OFFICE O/P EST MOD 30 MIN: CPT | Performed by: OBSTETRICS & GYNECOLOGY

## 2024-12-16 PROCEDURE — 86850 RBC ANTIBODY SCREEN: CPT

## 2024-12-16 PROCEDURE — 76815 OB US LIMITED FETUS(S): CPT | Performed by: STUDENT IN AN ORGANIZED HEALTH CARE EDUCATION/TRAINING PROGRAM

## 2024-12-16 PROCEDURE — 86803 HEPATITIS C AB TEST: CPT

## 2024-12-16 PROCEDURE — NC001 PR NO CHARGE: Performed by: STUDENT IN AN ORGANIZED HEALTH CARE EDUCATION/TRAINING PROGRAM

## 2024-12-16 PROCEDURE — 86706 HEP B SURFACE ANTIBODY: CPT

## 2024-12-16 PROCEDURE — 86780 TREPONEMA PALLIDUM: CPT

## 2024-12-16 PROCEDURE — 86901 BLOOD TYPING SEROLOGIC RH(D): CPT

## 2024-12-16 PROCEDURE — 87340 HEPATITIS B SURFACE AG IA: CPT

## 2024-12-16 PROCEDURE — 76815 OB US LIMITED FETUS(S): CPT | Performed by: OBSTETRICS & GYNECOLOGY

## 2024-12-16 PROCEDURE — 86762 RUBELLA ANTIBODY: CPT

## 2024-12-16 PROCEDURE — 86900 BLOOD TYPING SEROLOGIC ABO: CPT

## 2024-12-16 RX ORDER — ONDANSETRON 2 MG/ML
4 INJECTION INTRAMUSCULAR; INTRAVENOUS EVERY 6 HOURS PRN
Status: DISCONTINUED | OUTPATIENT
Start: 2024-12-16 | End: 2024-12-18

## 2024-12-16 RX ORDER — SODIUM CHLORIDE, SODIUM LACTATE, POTASSIUM CHLORIDE, CALCIUM CHLORIDE 600; 310; 30; 20 MG/100ML; MG/100ML; MG/100ML; MG/100ML
125 INJECTION, SOLUTION INTRAVENOUS CONTINUOUS
Status: DISCONTINUED | OUTPATIENT
Start: 2024-12-16 | End: 2024-12-18

## 2024-12-16 RX ORDER — BUPIVACAINE HYDROCHLORIDE 2.5 MG/ML
30 INJECTION, SOLUTION EPIDURAL; INFILTRATION; INTRACAUDAL ONCE AS NEEDED
Status: COMPLETED | OUTPATIENT
Start: 2024-12-16 | End: 2024-12-17

## 2024-12-16 NOTE — PROGRESS NOTES
Repeat Non-Stress Testing:      Patient stated she had clear to brownish LOF which started today 12/16/2024 @ 0900.   Fluid is now clear and continues as per patient.  Furthermore, @ 11am she stated she had discharge that was her mucous plug, size of cutie orang.     Denies any vaginal bleeding or feeling any ctx's, + FM as per patient. Denies any pain.    Patient is performing daily kick counts. NST strip reviewed by Dr. Greco.  Dr. Greco made aware of patient's current status. Dr. Greco discussing plan of care with patient.

## 2024-12-16 NOTE — PROGRESS NOTES
"[facility]: Veronica Pozo was seen today for NST (found under the pregnancy episode) which I reviewed the RN assessment and agree, and TEJINDER (see ultrasound report under OB procedures tab).  See ultrasound report under \"OB Procedures\" tab.       Veronica presents for NST plus TEJINDER in the setting of advanced maternal age over 40.    An NST is reactive in our office.  TEJINDER is normal at 13.6 cm.     She endorses losing her mucous plug and since then having intermittent clear leaking of fluid every hour for 5 times throughout the day.  She denies any bleeding or contractions and has good fetal movement.   surveillance is reassuring today.  I advised her to proceed to labor and delivery triage for rule out ruptured membranes.  Of note she does have regular contractions on tocometry but she does not feel them and is overall comfortable appearing. She is aware that delivery is recommended either way due to advanced maternal age over 40 (she is currently scheduled for IOL tomorrow night)    An epic haiku chat was sent to the inpatient team to notify them of her impending arrival    The time spent on this established patient on the encounter date included 5 minutes previsit service time reviewing records and precharting, 10 minutes face-to-face service time counseling regarding results and coordinating care, and  10 minutes charting, totalling 25 minutes.  Please don't hesitate to contact our office with any concerns or questions.  -Summer Greco MD      "

## 2024-12-16 NOTE — H&P
H & P- Obstetrics   Veronica Pozo 39 y.o. female MRN: [id number]  Unit/Bed#: LD TRIAGE  Encounter: [id number]    Assessment: 39 y.o.  at 40w5d admitted for induction of labor for non-reassuring fetal heart tones.     Plan:   Leakage of amniotic fluid  Assessment & Plan  Initially presented to triage for concern for ROM  ROM w/u negative  Negative for pooling, nitrazine, and ferning  TEJINDER 13.43cm    Encounter for induction of labor  Assessment & Plan  Induction of labor for category 2 tracing   Induction of labor consent signed      40 weeks gestation of pregnancy  Assessment & Plan  Admit to OBGYN   Clear liquid diet, IVF LR 125cc/hr   F/u T&S, CBC, RPR   GBS negative; EFW: 55%   SVE: /-2  Continuous fetal monitoring and tocometry   Analgesia at maternal request   Vertex by TAUS  Contraception plan: none  Plan: FB+pitocin, followed by AROM      * Non-reassuring electronic fetal monitoring tracing  Assessment & Plan  Had a variable deceleration while in triage          Discussed case and plan w/ Dr. Burrell      Chief Complaint: induction of labor for non-reassuring fetal heart tones    HPI: Veronica Pozo is a 39 y.o.  with an RAGHU of 2024, by Ultrasound at 40w5d who is being admitted for induction of labor for non-reassuring fetal heart tones. She originally presented to triage for leakage of fluid, and was found to have negative rupture of membrane workup. She complains of uterine contractions, is having contractions every 5 minutes but does not feel them, has no LOF, and reports no VB. She states she has felt good FM. She initially presented to triage for a rupture of membranes workup, which was negative. She then had variable deceleration during monitoring, so it was recommended to undergo induction of labor for non-reassuring fetal heart tones in the setting of being full term and being advanced maternal age. Discussed the risk of stillbirth if pregnancy was to be continued, and patient expressed  understanding and was amenable to induction of labor.     Patient Active Problem List   Diagnosis    40 weeks gestation of pregnancy    Primigravida of advanced maternal age in third trimester    Non-reassuring electronic fetal monitoring tracing    Encounter for induction of labor    Leakage of amniotic fluid       Baby complications/comments: none    Review of Systems   Constitutional:  Negative for chills and fever.   HENT:  Negative for ear pain and sore throat.    Eyes:  Negative for pain and visual disturbance.   Respiratory:  Negative for cough and shortness of breath.    Cardiovascular:  Negative for chest pain and palpitations.   Gastrointestinal:  Negative for abdominal pain and vomiting.   Genitourinary:  Negative for dysuria, hematuria, vaginal bleeding, vaginal discharge and vaginal pain.   Musculoskeletal:  Negative for arthralgias and back pain.   Skin:  Negative for color change and rash.   Neurological:  Negative for seizures and syncope.   Psychiatric/Behavioral:  The patient is not nervous/anxious.    All other systems reviewed and are negative.      OB Hx:  OB History    Para Term  AB Living   1        SAB IAB Ectopic Multiple Live Births             # Outcome Date GA Lbr Marcos/2nd Weight Sex Type Anes PTL Lv   1 Current                Past Medical Hx:  Past Medical History:   Diagnosis Date    Migraine     occas , non with this pregnancy    Varicella     pt had chickenpox childhodd and also vaccine       Past Surgical hx:  No past surgical history on file.    Social Hx:  Alcohol use: denies  Tobacco use: denies  Other substance use: denies    No Known Allergies      Medications Prior to Admission:     Prenatal Vit-Fe Fumarate-FA (PRENATAL VITAMIN PO)    Objective:  Temp:  [97.6 °F (36.4 °C)] 97.6 °F (36.4 °C)  HR:  [64-88] 88  BP: (106-110)/(70-80) 110/70  Resp:  [20] 20  SpO2:  [99 %] 99 %  There is no height or weight on file to calculate BMI.     Physical Exam:  Physical  "Exam  Constitutional:       Appearance: Normal appearance.   HENT:      Head: Normocephalic and atraumatic.      Mouth/Throat:      Pharynx: Oropharynx is clear.   Eyes:      General: No scleral icterus.     Extraocular Movements: Extraocular movements intact.   Cardiovascular:      Rate and Rhythm: Normal rate and regular rhythm.      Pulses: Normal pulses.   Pulmonary:      Effort: Pulmonary effort is normal. No respiratory distress.   Abdominal:      Tenderness: There is no abdominal tenderness.   Musculoskeletal:      Right lower leg: No edema.      Left lower leg: No edema.   Neurological:      General: No focal deficit present.      Mental Status: She is alert and oriented to person, place, and time.   Skin:     General: Skin is warm and dry.   Psychiatric:         Mood and Affect: Mood normal.         Behavior: Behavior normal.         Thought Content: Thought content normal.         Judgment: Judgment normal.   Vitals and nursing note reviewed. Exam conducted with a chaperone present.            FHT:  FHT cat 2. Baseline 150 bpm with moderate variability with presence of 15x15 accelerations, and one variable deceleration noted    TOCO:   Ctx q5 mins    Lab Results   Component Value Date    WBC 10.03 10/06/2024    HGB 11.2 (L) 10/06/2024    HCT 34.1 (L) 10/06/2024     10/06/2024     No results found for: \"NA\", \"K\", \"CL\", \"CO2\", \"BUN\", \"CREATININE\", \"GLUCOSE\", \"AST\", \"ALT\"    Prenatal Labs: Reviewed      Blood type: O positive  Antibody: negative  GBS: positive  HIV: Non-reactive  Rubella: no record, lab drawn  Syphilis IgM/IgG: Non-reactive  HBsAg: non-reactive, patient showed records from [facility]  HCAb: no records, lab drawn  Chlamydia: Negative  Gonorrhea: Negative  Diabetes 1 hour screen: normal  3 hour glucose: not indicated  Platelets: 221  Hgb: 11.2    >2 Midnights  INPATIENT     Signature/Title: Steve Valencia MD  Date: 12/16/2024  Time: 9:54 PM    "

## 2024-12-16 NOTE — PATIENT INSTRUCTIONS
Please go to Labor and Delivery now for evaluation.  The address of [facility] is [address] [address] ([facility] [place]).

## 2024-12-17 LAB
ABO GROUP BLD: NORMAL
BASOPHILS # BLD AUTO: 0.03 THOUSANDS/ΜL (ref 0–0.1)
BASOPHILS NFR BLD AUTO: 0 % (ref 0–1)
BLD GP AB SCN SERPL QL: NEGATIVE
EOSINOPHIL # BLD AUTO: 0.08 THOUSAND/ΜL (ref 0–0.61)
EOSINOPHIL NFR BLD AUTO: 1 % (ref 0–6)
ERYTHROCYTE [DISTWIDTH] IN BLOOD BY AUTOMATED COUNT: 13.1 % (ref 11.6–15.1)
HBV SURFACE AB SER-ACNC: <3 MIU/ML
HBV SURFACE AG SER QL: NORMAL
HCT VFR BLD AUTO: 36.1 % (ref 34.8–46.1)
HCV AB SER QL: NORMAL
HGB BLD-MCNC: 12 G/DL (ref 11.5–15.4)
HOLD SPECIMEN: NORMAL
IMM GRANULOCYTES # BLD AUTO: 0.06 THOUSAND/UL (ref 0–0.2)
IMM GRANULOCYTES NFR BLD AUTO: 1 % (ref 0–2)
LYMPHOCYTES # BLD AUTO: 1.88 THOUSANDS/ΜL (ref 0.6–4.47)
LYMPHOCYTES NFR BLD AUTO: 20 % (ref 14–44)
MCH RBC QN AUTO: 31.1 PG (ref 26.8–34.3)
MCHC RBC AUTO-ENTMCNC: 33.2 G/DL (ref 31.4–37.4)
MCV RBC AUTO: 94 FL (ref 82–98)
MONOCYTES # BLD AUTO: 0.42 THOUSAND/ΜL (ref 0.17–1.22)
MONOCYTES NFR BLD AUTO: 4 % (ref 4–12)
NEUTROPHILS # BLD AUTO: 7.09 THOUSANDS/ΜL (ref 1.85–7.62)
NEUTS SEG NFR BLD AUTO: 74 % (ref 43–75)
NRBC BLD AUTO-RTO: 0 /100 WBCS
PLATELET # BLD AUTO: 247 THOUSANDS/UL (ref 149–390)
PMV BLD AUTO: 9 FL (ref 8.9–12.7)
RBC # BLD AUTO: 3.86 MILLION/UL (ref 3.81–5.12)
RH BLD: POSITIVE
RUBV IGG SERPL IA-ACNC: 191.6 IU/ML
SPECIMEN EXPIRATION DATE: NORMAL
TREPONEMA PALLIDUM IGG+IGM AB [PRESENCE] IN SERUM OR PLASMA BY IMMUNOASSAY: NORMAL
WBC # BLD AUTO: 9.56 THOUSAND/UL (ref 4.31–10.16)

## 2024-12-17 PROCEDURE — 93005 ELECTROCARDIOGRAM TRACING: CPT

## 2024-12-17 PROCEDURE — 99214 OFFICE O/P EST MOD 30 MIN: CPT

## 2024-12-17 PROCEDURE — G0463 HOSPITAL OUTPT CLINIC VISIT: HCPCS

## 2024-12-17 RX ORDER — OXYTOCIN/RINGER'S LACTATE 30/500 ML
1-30 PLASTIC BAG, INJECTION (ML) INTRAVENOUS
Status: DISCONTINUED | OUTPATIENT
Start: 2024-12-17 | End: 2024-12-18

## 2024-12-17 RX ORDER — BUTORPHANOL TARTRATE 1 MG/ML
1 INJECTION, SOLUTION INTRAMUSCULAR; INTRAVENOUS ONCE
Status: COMPLETED | OUTPATIENT
Start: 2024-12-17 | End: 2024-12-17

## 2024-12-17 RX ORDER — EPHEDRINE SULFATE 50 MG/ML
INJECTION INTRAVENOUS AS NEEDED
Status: DISCONTINUED | OUTPATIENT
Start: 2024-12-17 | End: 2024-12-18 | Stop reason: HOSPADM

## 2024-12-17 RX ORDER — LIDOCAINE HYDROCHLORIDE AND EPINEPHRINE 15; 5 MG/ML; UG/ML
INJECTION, SOLUTION EPIDURAL
Status: COMPLETED | OUTPATIENT
Start: 2024-12-17 | End: 2024-12-17

## 2024-12-17 RX ADMIN — SODIUM CHLORIDE, SODIUM LACTATE, POTASSIUM CHLORIDE, AND CALCIUM CHLORIDE 125 ML/HR: .6; .31; .03; .02 INJECTION, SOLUTION INTRAVENOUS at 19:49

## 2024-12-17 RX ADMIN — BUPIVACAINE HYDROCHLORIDE 5 ML: 2.5 INJECTION, SOLUTION EPIDURAL; INFILTRATION; INTRACAUDAL; PERINEURAL at 10:20

## 2024-12-17 RX ADMIN — LIDOCAINE HYDROCHLORIDE AND EPINEPHRINE 3 ML: 15; 5 INJECTION, SOLUTION EPIDURAL at 10:16

## 2024-12-17 RX ADMIN — SODIUM CHLORIDE, SODIUM LACTATE, POTASSIUM CHLORIDE, AND CALCIUM CHLORIDE 125 ML/HR: .6; .31; .03; .02 INJECTION, SOLUTION INTRAVENOUS at 15:34

## 2024-12-17 RX ADMIN — SODIUM CHLORIDE, SODIUM LACTATE, POTASSIUM CHLORIDE, AND CALCIUM CHLORIDE 125 ML/HR: .6; .31; .03; .02 INJECTION, SOLUTION INTRAVENOUS at 09:57

## 2024-12-17 RX ADMIN — SODIUM CHLORIDE, SODIUM LACTATE, POTASSIUM CHLORIDE, AND CALCIUM CHLORIDE 125 ML/HR: .6; .31; .03; .02 INJECTION, SOLUTION INTRAVENOUS at 04:48

## 2024-12-17 RX ADMIN — ROPIVACAINE HYDROCHLORIDE: 2 INJECTION, SOLUTION EPIDURAL; INFILTRATION at 10:26

## 2024-12-17 RX ADMIN — BUTORPHANOL TARTRATE 1 MG: 1 INJECTION, SOLUTION INTRAMUSCULAR; INTRAVENOUS at 09:02

## 2024-12-17 RX ADMIN — OXYTOCIN 2 MILLI-UNITS/MIN: 10 INJECTION INTRAVENOUS at 02:06

## 2024-12-17 RX ADMIN — LIDOCAINE HYDROCHLORIDE AND EPINEPHRINE 2 ML: 15; 5 INJECTION, SOLUTION EPIDURAL at 10:17

## 2024-12-17 RX ADMIN — ROPIVACAINE HYDROCHLORIDE: 2 INJECTION, SOLUTION EPIDURAL; INFILTRATION at 19:22

## 2024-12-17 RX ADMIN — SODIUM CHLORIDE, SODIUM LACTATE, POTASSIUM CHLORIDE, AND CALCIUM CHLORIDE 125 ML/HR: .6; .31; .03; .02 INJECTION, SOLUTION INTRAVENOUS at 13:42

## 2024-12-17 RX ADMIN — EPHEDRINE SULFATE 10 MG: 50 INJECTION INTRAVENOUS at 10:23

## 2024-12-17 RX ADMIN — SODIUM CHLORIDE, SODIUM LACTATE, POTASSIUM CHLORIDE, AND CALCIUM CHLORIDE 125 ML/HR: .6; .31; .03; .02 INJECTION, SOLUTION INTRAVENOUS at 00:00

## 2024-12-17 NOTE — OB LABOR/OXYTOCIN SAFETY PROGRESS
Labor Progress Note - Veronica Pozo 39 y.o. female MRN: [id number]    Unit/Bed#: -01 Encounter: [id number]                Cervical Dilation: 1        Cervical Effacement: 80  Fetal Station: -2     Fetal Heart Rate (FHT): 150 BPM  FHR Category: 1               Vital Signs:   Vitals:    12/16/24 1849   BP: 110/70   Pulse: 88   Resp: 20   Temp: 97.6 °F (36.4 °C)   SpO2: 99%       Notes/comments:  SVE as noted above. Tracing cat 1 with baseline of 150. Nathan balloon was inserted with pitocin to be started shortly.     PROCEDURE:  NATHAN BALLOON PLACEMENT    A 24F nathan with a 30cc balloon was selected, SVE was performed and cervix was located, nathan was introduced over sterile gloved hands. Balloon advanced through cervix beyond the internal cervical os. A small amount amount of sterile saline solution was instilled in the balloon to confirm placement. Placement was confirmed to be beyond the internal cervical os. A total of 60cc of sterile saline solution was placed into the balloon. Pt tolerated well. Notify MD when nathan dislodged           Steve Valencia MD 12/17/2024 12:29 AM

## 2024-12-17 NOTE — ANESTHESIA PREPROCEDURE EVALUATION
Procedure:  LABOR ANALGESIA    Relevant Problems   GYN   (+) 40 weeks gestation of pregnancy        Physical Exam    Airway    Mallampati score: II  TM Distance: >3 FB  Neck ROM: full     Dental   No notable dental hx     Cardiovascular  Rhythm: regular, Rate: normal, Cardiovascular exam normal    Pulmonary  Pulmonary exam normal Breath sounds clear to auscultation    Other Findings  post-pubertal.      Anesthesia Plan  ASA Score- 2     Anesthesia Type- epidural with ASA Monitors.         Additional Monitors:     Airway Plan:     Comment: Discussed the risks/benefits of neuraxial anesthesia, including risk of bleeding/infection/damage to underlying structures, the likely side effect of nausea, and unlikely complication of spinal headache..       Plan Factors-Exercise tolerance (METS): >4 METS.    Chart reviewed.   Existing labs reviewed. Patient summary reviewed.    Patient is not a current smoker.  Patient instructed to abstain from smoking on day of procedure. Patient did not smoke on day of surgery.            Induction-     Postoperative Plan-     Perioperative Resuscitation Plan - Level 1 - Full Code.       Informed Consent- Anesthetic plan and risks discussed with patient.  I personally reviewed this patient with the CRNA. Discussed and agreed on the Anesthesia Plan with the CRNA..

## 2024-12-17 NOTE — ANESTHESIA PROCEDURE NOTES
Epidural Block    Patient location during procedure: OB/L&D  Start time: 12/17/2024 10:15 AM  Reason for block: procedure for pain  Staffing  Performed by: Burt Douglas MD  Authorized by: Burt Douglas MD    Preanesthetic Checklist  Completed: patient identified, IV checked, site marked, risks and benefits discussed, surgical consent, monitors and equipment checked, pre-op evaluation and timeout performed  Epidural  Patient position: sitting  Prep: ChloraPrep  Sedation Level: no sedation  Patient monitoring: frequent blood pressure checks, continuous pulse oximetry and heart rate  Approach: midline  Location: lumbar, L3-4  Injection technique: JOSE saline  Needle  Needle type: Tuohy   Needle gauge: 17 G  Needle insertion depth: 5 cm  Catheter type: multi-orifice and spring wound  Catheter size: 19 G  Catheter at skin depth: 11 cm  Catheter securement method: stabilization device, clear occlusive dressing and tape  Test dose: negativelidocaine-epinephrine (XYLOCAINE-MPF/EPINEPHRINE) 1.5 %-1:200,000 injection 3 mL - Epidural   3 mL - 12/17/2024 10:16:00 AM  Assessment  Sensory level: T10  Number of attempts: 1negative aspiration for CSF, negative aspiration for heme and no paresthesia on injection  patient tolerated the procedure well with no immediate complications  Additional Notes  Unremarkable epidural

## 2024-12-17 NOTE — OB LABOR/OXYTOCIN SAFETY PROGRESS
Oxytocin Safety Progress Check Note - Veronica Pozo 39 y.o. female MRN: [id number]    Unit/Bed#: -01 Encounter: [id number]    Dose (kee-units/min) Oxytocin: 4 kee-units/min  Contraction Frequency (minutes): 2-4  Contraction Intensity: Moderate  Uterine Activity Characteristics: Irregular  Cervical Dilation: 6        Cervical Effacement: 90  Fetal Station: -1  Baseline Rate (FHR): 145 bpm  Fetal Heart Rate (FHT): 148 BPM  FHR Category: Category I      Vital Signs:   Vitals:    12/17/24 0744   BP: 106/55   Pulse: 76   Resp:    Temp:    SpO2:        Notes/comments:   Patient feeling contractions, continue to monitor and manage.    Daily Wilkins MD 12/17/2024 8:00 AM

## 2024-12-17 NOTE — OB LABOR/OXYTOCIN SAFETY PROGRESS
Oxytocin Safety Progress Check Note - Veronica Pozo 39 y.o. female MRN: [id number]    Unit/Bed#: -01 Encounter: [id number]    Dose (kee-units/min) Oxytocin: 4 kee-units/min  Contraction Frequency (minutes): 1.5-3  Contraction Intensity: Moderate/Strong  Uterine Activity Characteristics: Irregular  Cervical Dilation: 8        Cervical Effacement: 90  Fetal Station: -1  Baseline Rate (FHR): 150 bpm  Fetal Heart Rate (FHT): 132 BPM  FHR Category: I               Vital Signs:   Vitals:    12/17/24 1247   BP: (!) 81/47   Pulse: 78   Resp:    Temp:    SpO2:        Notes/comments:   Patient presented unchanged check, increasing pit as tolerated by the fetus.     Lotus Slater MD 12/17/2024 12:58 PM

## 2024-12-17 NOTE — OB LABOR/OXYTOCIN SAFETY PROGRESS
Oxytocin Safety Progress Check Note - Veronica Pozo 39 y.o. female MRN: [id number]    Unit/Bed#: -01 Encounter: [id number]    Dose (kee-units/min) Oxytocin: 4 kee-units/min  Contraction Frequency (minutes): 1-3  Contraction Intensity: Moderate/Strong  Uterine Activity Characteristics: Irregular  Cervical Dilation: 8        Cervical Effacement: 90  Fetal Station: -1  Baseline Rate (FHR): 130 bpm  Fetal Heart Rate (FHT): 138 BPM (NOVII signal weak)  FHR Category: Category I      Vital Signs:   Vitals:    12/17/24 1037   BP: 95/54   Pulse: (!) 109   Resp:    Temp:    SpO2:        Notes/comments:   Patient comfortable with epidural, continue to monitor and manage.    Daily Wilkins MD 12/17/2024 10:45 AM

## 2024-12-17 NOTE — PLAN OF CARE
Problem: Knowledge Deficit  Goal: Verbalizes understanding of labor plan  Description: Assess patient/family/caregiver's baseline knowledge level and ability to understand information.  Provide education via patient/family/caregiver's preferred learning method at appropriate level of understanding.     1. Provide teaching at level of understanding.  2. Provide teaching via preferred learning method(s).  Outcome: Progressing  Goal: Patient/family/caregiver demonstrates understanding of disease process, treatment plan, medications, and discharge instructions  Description: Complete learning assessment and assess knowledge base.  Interventions:  - Provide teaching at level of understanding  - Provide teaching via preferred learning methods  Outcome: Progressing     Problem: Labor & Delivery  Goal: Manages discomfort  Description: Assess and monitor for signs and symptoms of discomfort.  Assess patient's pain level regularly and per hospital policy.  Administer medications as ordered. Support use of nonpharmacological methods to help control pain such as distraction, imagery, relaxation, and application of heat and cold.  Collaborate with interdisciplinary team and patient to determine appropriate pain management plan.    1. Include patient in decisions related to comfort.  2. Offer non-pharmacological pain management interventions.  3. Report ineffective pain management to physician.  Outcome: Progressing  Goal: Patient vital signs are stable  Description: 1. Assess vital signs - vaginal delivery.  Outcome: Progressing     Problem: PAIN - ADULT  Goal: Verbalizes/displays adequate comfort level or baseline comfort level  Description: Interventions:  - Encourage patient to monitor pain and request assistance  - Assess pain using appropriate pain scale  - Administer analgesics based on type and severity of pain and evaluate response  - Implement non-pharmacological measures as appropriate and evaluate response  -  Consider cultural and social influences on pain and pain management  - Notify physician/advanced practitioner if interventions unsuccessful or patient reports new pain  Outcome: Progressing     Problem: INFECTION - ADULT  Goal: Absence or prevention of progression during hospitalization  Description: INTERVENTIONS:  - Assess and monitor for signs and symptoms of infection  - Monitor lab/diagnostic results  - Monitor all insertion sites, i.e. indwelling lines, tubes, and drains  - Monitor endotracheal if appropriate and nasal secretions for changes in amount and color  - [place] appropriate cooling/warming therapies per order  - Administer medications as ordered  - Instruct and encourage patient and family to use good hand hygiene technique  - Identify and instruct in appropriate isolation precautions for identified infection/condition  Outcome: Progressing  Goal: Absence of fever/infection during neutropenic period  Description: INTERVENTIONS:  - Monitor WBC    Outcome: Progressing     Problem: SAFETY ADULT  Goal: Patient will remain free of falls  Description: INTERVENTIONS:  - Educate patient/family on patient safety including physical limitations  - Instruct patient to call for assistance with activity   - Consult OT/PT to assist with strengthening/mobility   - Keep Call bell within reach  - Keep bed low and locked with side rails adjusted as appropriate  - Keep care items and personal belongings within reach  - Initiate and maintain comfort rounds  - Make Fall Risk Sign visible to staff    - Apply yellow socks and bracelet for high fall risk patients  - Consider moving patient to room near nurses station  Outcome: Progressing  Goal: Maintain or return to baseline ADL function  Description: INTERVENTIONS:  -  Assess patient's ability to carry out ADLs; assess patient's baseline for ADL function and identify physical deficits which impact ability to perform ADLs (bathing, care of mouth/teeth, toileting,  grooming, dressing, etc.)  - Assess/evaluate cause of self-care deficits   - Assess range of motion  - Assess patient's mobility; develop plan if impaired  - Assess patient's need for assistive devices and provide as appropriate  - Encourage maximum independence but intervene and supervise when necessary  - Involve family in performance of ADLs  - Assess for home care needs following discharge   - Consider OT consult to assist with ADL evaluation and planning for discharge  - Provide patient education as appropriate  Outcome: Progressing  Goal: Maintains/Returns to pre admission functional level  Description: INTERVENTIONS:  - Perform AM-PAC 6 Click Basic Mobility/ Daily Activity assessment daily.  - Set and communicate daily mobility goal to care team and patient/family/caregiver.   - Collaborate with rehabilitation services on mobility goals if consulted    - Out of bed for toileting  - Record patient progress and toleration of activity level   Outcome: Progressing     Problem: DISCHARGE PLANNING  Goal: Discharge to home or other facility with appropriate resources  Description: INTERVENTIONS:  - Identify barriers to discharge w/patient and caregiver  - Arrange for needed discharge resources and transportation as appropriate  - Identify discharge learning needs (meds, wound care, etc.)  - Arrange for interpretive services to assist at discharge as needed  - Refer to Case Management Department for coordinating discharge planning if the patient needs post-hospital services based on physician/advanced practitioner order or complex needs related to functional status, cognitive ability, or social support system  Outcome: Progressing

## 2024-12-17 NOTE — OB LABOR/OXYTOCIN SAFETY PROGRESS
Oxytocin Safety Progress Check Note - Veronica Pozo 39 y.o. female MRN: [id number]  Huddle Note  Unit/Bed#: -01 Encounter: [id number]    Dose (kee-units/min) Oxytocin: 0 kee-units/min (Dr Wilkins aware; due to Fetal heart tracing)  Contraction Frequency (minutes): 3-5  Contraction Intensity: Moderate  Uterine Activity Characteristics: Irregular  Cervical Dilation: 9        Cervical Effacement: 90  Fetal Station: 0  Baseline Rate (FHR): 205 bpm  Fetal Heart Rate (FHT): 202 BPM  FHR Category: II  Some late deceleration, resolved with oxytocin off position change and fluid.  Some fetal tachycardia at this time, reviewed with patient and , continue to monitor and manage..  's moderate variability      Vital Signs:   Vitals:    24 1540   BP: 111/63   Pulse: (!) 137   Resp:    Temp:    SpO2:        Notes/comments:   Reviewed with patient and  will give some time to resolve, if continued concerns that we may further discuss  delivery if not improving.    Daily Wilkins MD 2024 3:44 PM

## 2024-12-17 NOTE — OB LABOR/OXYTOCIN SAFETY PROGRESS
Oxytocin Safety Progress Check Note - Veronica Pozo 39 y.o. female MRN: [id number]    Unit/Bed#: -01 Encounter: [id number]    Dose (kee-units/min) Oxytocin: 2 kee-units/min  Contraction Frequency (minutes): 2-4.5  Contraction Intensity: Moderate  Uterine Activity Characteristics: Irregular  Cervical Dilation: 5        Cervical Effacement: 80  Fetal Station: -1  Baseline Rate (FHR): 160 bpm  Fetal Heart Rate (FHT): 148 BPM  FHR Category: 1               Vital Signs:   Vitals:    12/17/24 0600   BP: 95/52   Pulse: 72   Resp:    Temp:    SpO2:        Notes/comments:   S/p hill balloon. Continue titrating pitocin followed by NICK Serrano MD 12/17/2024 6:37 AM

## 2024-12-17 NOTE — OB LABOR/OXYTOCIN SAFETY PROGRESS
Oxytocin Safety Progress Check Note - Veronica Pozo 39 y.o. female MRN: [id number]    Unit/Bed#: -01 Encounter: [id number]    Dose (kee-units/min) Oxytocin: 2 kee-units/min (verbal per dr wilkins to restart pitocin at 2)  Contraction Frequency (minutes): 2-5  Contraction Intensity: Moderate  Uterine Activity Characteristics: Irregular  Cervical Dilation: 9        Cervical Effacement: 90  Fetal Station: 0  Baseline Rate (FHR): 185 bpm  Fetal Heart Rate (FHT): 202 BPM  FHR Category: Category I   FHT: 180's moderate variability with accelerations and no decelerations.      Vital Signs:   Vitals:    12/17/24 1725   BP: 119/70   Pulse: (!) 106   Resp:    Temp:    SpO2:        Notes/comments:   Patient comfortable, cervix unchanged, fht reassuring, contractions spaced out, restart oxytocin at 2 and titrate as needed.    Daily Wilkins MD 12/17/2024 5:33 PM

## 2024-12-17 NOTE — QUICK NOTE
Pt wants medications for pain, discussed about an epidural, which was refused Wants stadol, discussed the side effects.    Lotus Hendricks MD  OB GYN PGY1  12/17/24  8:58 AM

## 2024-12-17 NOTE — OB LABOR/OXYTOCIN SAFETY PROGRESS
Oxytocin Safety Progress Check Note - Veroncia Pozo 39 y.o. female MRN: [id number]    Unit/Bed#: -01 Encounter: [id number]    Dose (kee-units/min) Oxytocin: 2 kee-units/min  Contraction Frequency (minutes): 3-5  Contraction Intensity: Moderate  Uterine Activity Characteristics: Irregular  Cervical Dilation: 1        Cervical Effacement: 80  Fetal Station: -2  Baseline Rate (FHR): 150 bpm  Fetal Heart Rate (FHT): 148 BPM  FHR Category: 1               Vital Signs:   Vitals:    12/17/24 0445   BP: 99/60   Pulse: 73   Resp:    Temp: 97.8 °F (36.6 °C)   SpO2:        Notes/comments:   SVE deferred at this time. Livingston balloon still in place. Ctx q6-7mins on pit of 2.       Steve Valencia MD 12/17/2024 5:13 AM

## 2024-12-17 NOTE — ASSESSMENT & PLAN NOTE
Admit to OBGYN   Clear liquid diet, IVF LR 125cc/hr   F/u T&S, CBC, RPR   GBS negative; EFW: 55%   SVE: 1/80/-2  Continuous fetal monitoring and tocometry   Analgesia at maternal request   Vertex by TAUS  Contraception plan: none  Plan: FB+pitocin, followed by AROM

## 2024-12-17 NOTE — OB LABOR/OXYTOCIN SAFETY PROGRESS
Oxytocin Safety Progress Check Note - Veronica Pozo 39 y.o. female MRN: [id number]    Unit/Bed#: -01 Encounter: [id number]    Dose (kee-units/min) Oxytocin: 8 kee-units/min  Contraction Frequency (minutes): 2-4  Contraction Intensity: Moderate/Strong  Uterine Activity Characteristics: Irregular  Cervical Dilation: 9        Cervical Effacement: 90  Fetal Station: 0  Baseline Rate (FHR): 155 bpm  Fetal Heart Rate (FHT): 132 BPM  FHR Category: II  FHT: 140's moderate variability, 2 min deceleration after drop in maternal bp, resolved with position change and ephedrine.  Accelerations.         Vital Signs:   Vitals:    12/17/24 1351   BP:    Pulse:    Resp: 20   Temp: 98.7 °F (37.1 °C)   SpO2:        Notes/comments:   Continue to monitor and manage.    Daily Wilkins MD 12/17/2024 2:51 PM

## 2024-12-17 NOTE — ASSESSMENT & PLAN NOTE
Initially presented to triage for concern for ROM  ROM w/u negative  Negative for pooling, nitrazine, and ferning  TEJINDER 13.43cm

## 2024-12-17 NOTE — PROCEDURES
Veronica Pozo, a  at 40w5d with an RAGHU of 2024, by Ultrasound, was seen at [facility] DELIVERY for the following procedure(s): $Procedure Type: TEJINDER]    Nonstress Test  Variability: Moderate  Decelerations: Variable  Accelerations: Yes  Acoustic Stimulator: No  Baseline: 150 BPM  Uterine Irritability: No  Contractions: Regular    4 Quadrant TEJINDER  TEJINDER Q1 (cm): 3.4 cm  TEJINDER Q2 (cm): 4.1 cm  TEJINDER Q3 (cm): 3.4 cm  TEJINDER Q4 (cm): 2.5 cm  TEJINDER TOTAL (cm): 13.4 cm           Media Information      Document Information    Clinical Image - Mobile Device   TEJINDER   2024 20:23   Attached To:   Hospital Encounter on 24   Source Information    Steve Valencia MD  An L&D   Document History

## 2024-12-18 LAB
ATRIAL RATE: 156 BPM
BASE EXCESS BLDCOA CALC-SCNC: -11 MMOL/L (ref 3–11)
BASE EXCESS BLDCOV CALC-SCNC: -10.7 MMOL/L (ref 1–9)
DME PARACHUTE DELIVERY DATE ACTUAL: NORMAL
DME PARACHUTE DELIVERY DATE REQUESTED: NORMAL
DME PARACHUTE ITEM DESCRIPTION: NORMAL
DME PARACHUTE ORDER STATUS: NORMAL
DME PARACHUTE SUPPLIER NAME: NORMAL
DME PARACHUTE SUPPLIER PHONE: NORMAL
HCO3 BLDCOA-SCNC: 17.5 MMOL/L (ref 17.3–27.3)
HCO3 BLDCOV-SCNC: 15.7 MMOL/L (ref 12.2–28.6)
O2 CT VFR BLDCOA CALC: 3.5 ML/DL
OXYHGB MFR BLDCOA: 18 %
OXYHGB MFR BLDCOV: 47 %
P AXIS: 61 DEGREES
PCO2 BLDCOA: 49 MM[HG] (ref 30–60)
PCO2 BLDCOV: 36.6 MM HG (ref 27–43)
PH BLDCOA: 7.17 [PH] (ref 7.23–7.43)
PH BLDCOV: 7.25 [PH] (ref 7.19–7.49)
PO2 BLDCOA: 14.2 MM HG (ref 5–25)
PO2 BLDCOV: 21.8 MM HG (ref 15–45)
PR INTERVAL: 114 MS
QRS AXIS: 52 DEGREES
QRSD INTERVAL: 66 MS
QT INTERVAL: 316 MS
QTC INTERVAL: 510 MS
SAO2 % BLDCOV: 9.5 ML/DL
T WAVE AXIS: 67 DEGREES
VENTRICULAR RATE: 156 BPM

## 2024-12-18 PROCEDURE — 4A1HXCZ MONITORING OF PRODUCTS OF CONCEPTION, CARDIAC RATE, EXTERNAL APPROACH: ICD-10-PCS | Performed by: OBSTETRICS & GYNECOLOGY

## 2024-12-18 PROCEDURE — 59400 OBSTETRICAL CARE: CPT | Performed by: OBSTETRICS & GYNECOLOGY

## 2024-12-18 PROCEDURE — 10907ZC DRAINAGE OF AMNIOTIC FLUID, THERAPEUTIC FROM PRODUCTS OF CONCEPTION, VIA NATURAL OR ARTIFICIAL OPENING: ICD-10-PCS | Performed by: OBSTETRICS & GYNECOLOGY

## 2024-12-18 PROCEDURE — NC001 PR NO CHARGE: Performed by: OBSTETRICS & GYNECOLOGY

## 2024-12-18 PROCEDURE — 93010 ELECTROCARDIOGRAM REPORT: CPT | Performed by: INTERNAL MEDICINE

## 2024-12-18 PROCEDURE — 3E033VJ INTRODUCTION OF OTHER HORMONE INTO PERIPHERAL VEIN, PERCUTANEOUS APPROACH: ICD-10-PCS | Performed by: OBSTETRICS & GYNECOLOGY

## 2024-12-18 PROCEDURE — 0KQM0ZZ REPAIR PERINEUM MUSCLE, OPEN APPROACH: ICD-10-PCS | Performed by: OBSTETRICS & GYNECOLOGY

## 2024-12-18 PROCEDURE — 82805 BLOOD GASES W/O2 SATURATION: CPT | Performed by: STUDENT IN AN ORGANIZED HEALTH CARE EDUCATION/TRAINING PROGRAM

## 2024-12-18 PROCEDURE — 88307 TISSUE EXAM BY PATHOLOGIST: CPT | Performed by: PATHOLOGY

## 2024-12-18 RX ORDER — DOCUSATE SODIUM 100 MG/1
100 CAPSULE, LIQUID FILLED ORAL 2 TIMES DAILY PRN
Status: DISCONTINUED | OUTPATIENT
Start: 2024-12-18 | End: 2024-12-19 | Stop reason: HOSPADM

## 2024-12-18 RX ORDER — METHYLERGONOVINE MALEATE 0.2 MG/ML
INJECTION INTRAVENOUS
Status: DISCONTINUED
Start: 2024-12-18 | End: 2024-12-18 | Stop reason: WASHOUT

## 2024-12-18 RX ORDER — BENZOCAINE/MENTHOL 6 MG-10 MG
1 LOZENGE MUCOUS MEMBRANE DAILY PRN
Status: DISCONTINUED | OUTPATIENT
Start: 2024-12-18 | End: 2024-12-19 | Stop reason: HOSPADM

## 2024-12-18 RX ORDER — OXYTOCIN/RINGER'S LACTATE 30/500 ML
250 PLASTIC BAG, INJECTION (ML) INTRAVENOUS ONCE
Status: COMPLETED | OUTPATIENT
Start: 2024-12-18 | End: 2024-12-18

## 2024-12-18 RX ORDER — TRANEXAMIC ACID 10 MG/ML
1000 INJECTION, SOLUTION INTRAVENOUS ONCE
Status: COMPLETED | OUTPATIENT
Start: 2024-12-18 | End: 2024-12-18

## 2024-12-18 RX ORDER — IBUPROFEN 600 MG/1
600 TABLET, FILM COATED ORAL EVERY 6 HOURS PRN
Status: DISCONTINUED | OUTPATIENT
Start: 2024-12-18 | End: 2024-12-19 | Stop reason: HOSPADM

## 2024-12-18 RX ORDER — ACETAMINOPHEN 325 MG/1
650 TABLET ORAL EVERY 6 HOURS PRN
Status: DISCONTINUED | OUTPATIENT
Start: 2024-12-18 | End: 2024-12-19 | Stop reason: HOSPADM

## 2024-12-18 RX ORDER — CARBOPROST TROMETHAMINE 250 UG/ML
INJECTION, SOLUTION INTRAMUSCULAR
Status: DISPENSED
Start: 2024-12-18 | End: 2024-12-18

## 2024-12-18 RX ORDER — ACETAMINOPHEN 325 MG/1
650 TABLET ORAL EVERY 6 HOURS PRN
Status: DISCONTINUED | OUTPATIENT
Start: 2024-12-18 | End: 2024-12-18

## 2024-12-18 RX ADMIN — HYDROCORTISONE 1 APPLICATION: 1 CREAM TOPICAL at 01:48

## 2024-12-18 RX ADMIN — BENZOCAINE AND LEVOMENTHOL 1 APPLICATION: 200; 5 SPRAY TOPICAL at 01:48

## 2024-12-18 RX ADMIN — IBUPROFEN 600 MG: 600 TABLET, FILM COATED ORAL at 05:32

## 2024-12-18 RX ADMIN — ACETAMINOPHEN 650 MG: 325 TABLET, FILM COATED ORAL at 01:48

## 2024-12-18 RX ADMIN — TRANEXAMIC ACID 1000 MG: 10 INJECTION, SOLUTION INTRAVENOUS at 00:46

## 2024-12-18 RX ADMIN — ACETAMINOPHEN 650 MG: 325 TABLET, FILM COATED ORAL at 23:31

## 2024-12-18 RX ADMIN — SODIUM CHLORIDE, SODIUM LACTATE, POTASSIUM CHLORIDE, AND CALCIUM CHLORIDE 125 ML/HR: .6; .31; .03; .02 INJECTION, SOLUTION INTRAVENOUS at 00:02

## 2024-12-18 RX ADMIN — WITCH HAZEL 1 PAD: 500 SOLUTION RECTAL; TOPICAL at 01:48

## 2024-12-18 RX ADMIN — OXYTOCIN 250 MILLI-UNITS/MIN: 10 INJECTION INTRAVENOUS at 01:48

## 2024-12-18 NOTE — DISCHARGE SUMMARY
Discharge Summary - Veronica Pozo 39 y.o. female MRN: [id number]    Unit/Bed#: -01 Encounter: [id number]    ADMISSION  Admission Date: 2024   Admitting Attending: Dr. Daily Wilkins MD  Admitting Diagnoses:   Patient Active Problem List   Diagnosis    40 weeks gestation of pregnancy    Primigravida of advanced maternal age in third trimester    Non-reassuring electronic fetal monitoring tracing    Leakage of amniotic fluid     (spontaneous vaginal delivery)       DELIVERY  Delivery Method: Vaginal, Spontaneous   Delivery Date and Time: 2024 12:39 AM  Delivery Attending: Daily Wilkins    DISCHARGE  Discharge Date: 24  Discharge Attending: Dr. Daily Wilkins MD  Discharge Diagnosis:   Same, Delivered    Clinical course: Admission to Delivery  Veronica Pozo is a 39 y.o.  who was admitted at 41w0d for induction of labor for nonreassuring fetal heart tones, advanced maternal age.  She received a [place] balloon and Pitocin.  She then received an epidural for pain control.  The Pitocin was turned off after 2-minute deceleration associated with hypotension which resolved after ephedrine and repositioning.  Pitocin was restarted and turned off again for persistent fetal tachycardia and the patient received a fluid bolus.  It was then restarted and she progressed to complete.  She pushed and delivered.      Delivery  Route of Delivery: Vaginal, Spontaneous    Anesthesia: Epidural,   QBL: Non-Surgical QBL (mL): 226      QBL:        Delivery: Vaginal, Spontaneous at 2024 12:39 AM  Laceration: Perineal: 2° Repaired? Yes    Baby's Weight: 3663 g (8 lb 1.2 oz); 129.2    Apgar scores: 8  and 9  at 1 and 5 minutes, respectively      Clinical Course: Post-Delivery:  The post delivery course was unremarkable.    On the day of discharge, the patient was ambulating, voiding spontaneously, tolerating oral intake, and hemodynamically stable. She was able to reasonably perform all ADLs. She had  appropriate bowel function. Pain was well-controlled. She was discharged home on postpartum/postop day #1 without complications. Patient was instructed to follow up with her OB as an outpatient and was given appropriate warnings to call her provider with problems or concerns.    Pertinent lab findings included:     Last three Hgb values:  Lab Results   Component Value Date    HGB 12.0 2024    HGB 11.2 (L) 10/06/2024        Problem-specific follow-up plans included the following:  Problem List       40 weeks gestation of pregnancy    Overview   - [facility] from [facility]   - prenatal US reviewed- RAGHU 24 by first tri US  - first tri labs reviewed, wnl   - s/p flu and TDAP          Primigravida of advanced maternal age in third trimester    Overview   -Low risk aneuploidy screening  -Appropriate growth 10/18/24 by [facility] US  -Needs weekly NST+TEJINDER beginning between 32-36 weeks continued until delivery (wants to do at [facility])  -Delivery by [facility]         Non-reassuring electronic fetal monitoring tracing    Leakage of amniotic fluid    * (Principal)  (spontaneous vaginal delivery)    Current Assessment & Plan   Recovering well   Routine postpartum care, encourage ambulation, encourage familial bonding  Lactation support for breast/bottle feeding as needed  Pre-delivery Hgb 12  FEN: Tolerating regular diet  Pain: Motrin/Tylenol around the clock, oxycodone if needed  Appropriate bowel and bladder function   DVT Ppx: Ambulation          Other Visit Diagnoses         Postpartum care and examination of lactating mother    -  Primary                Medication List      ASK your doctor about these medications     PRENATAL VITAMIN PO       Condition at discharge:   good     Disposition:   Home    Planned Readmission:   No

## 2024-12-18 NOTE — ANESTHESIA POSTPROCEDURE EVALUATION
Post-Op Assessment Note    CV Status:  Stable    Pain management: adequate      Post-op block assessment: catheter intact   Mental Status:  Alert and awake   Hydration Status:  Euvolemic   PONV Controlled:  Controlled   Airway Patency:  Patent     Post Op Vitals Reviewed: Yes    No anethesia notable event occurred.    Staff: Anesthesiologist           Last Filed PACU Vitals:  Vitals Value Taken Time   Temp     Pulse     BP     Resp     SpO2         Modified Pineda:  No data recorded

## 2024-12-18 NOTE — OB LABOR/OXYTOCIN SAFETY PROGRESS
Oxytocin Safety Progress Check Note - Veronica Pozo 39 y.o. female MRN: [id number]    Unit/Bed#: -01 Encounter: [id number]    Dose (kee-units/min) Oxytocin: 6 kee-units/min (per dr riggs)  Contraction Frequency (minutes): 4  Contraction Intensity: Moderate  Uterine Activity Characteristics: Irregular  Cervical Dilation: 9        Cervical Effacement: 90  Fetal Station: 0  Baseline Rate (FHR): 160 bpm  Fetal Heart Rate (FHT): 202 BPM  FHR Category: 2               Vital Signs:   Vitals:    12/17/24 1945   BP: 105/60   Pulse:    Resp:    Temp:    SpO2:        Notes/comments:   Fetal tachycardia to the 170s with moderate variability. IUPC placed. Continue titrating pitocin as overall reassuring fetal status without decelerations.      Freida Riggs MD 12/17/2024 7:53 PM

## 2024-12-18 NOTE — L&D DELIVERY NOTE
Delivery Note    Obstetrician:   Claudette    Pre-Delivery Diagnosis: Induced labor, Single fetus, and post term pregnancy with FHR deceleration    Post-Delivery Diagnosis: Same as above - Delivered or 2nd degree laceration    Procedure: Repair second degree spontaneous laceration, SAVD     Episiotomy: none    Laceration: 2nd degree    Estimated Blood Loss:  QBL: 186 mL           Complications:  none    Venous Ph: 7.249  BE -10.7  Arterial Ph: 7.171  BE: -11    Details: Patient delivered a viable Male  over  second degree laceration.  Baby presented and delivered occiput anterior and reconstituted to ROT with maternal effort and gentle downward pressure the left anterior shoulder delivered.  With maternal effort and gentle upward pressure the right posterior shoulder delivered followed by the body.  Baby was immediately bulb suctioned and after suctioning began to give hearty cries, after delivery of the  and appropriate delay, the umbilical cord was doubly clamped and cut and the  was passed to staff for routine care. Umbilical cord blood and umbilical artery and venous gases were collected. Active management of the third stage of labor was undertaken with IV pitocin. Bleeding was noted to be under control.  Placenta delivered intact with 3-v cord and trailing membranes.  TXA x 1 dose was given as there was some bleeding prior to placental delivery and uterus responded well to massage.    Inspection of the perineum revealed the above which was then repaired in standard fashion with 3-0 Vicryl rapid. The lacerations showed good tissue reapproximation and hemostasis.  Uterus was massaged and cleared of clot revealing good tone and hemostasis.  There is a small right periurethral laceration that was hemostatic and did not require repair.    Mother and baby are currently recovering nicely in stable condition.           Attending Attestation: I was present for the entire procedure.

## 2024-12-18 NOTE — PLAN OF CARE
Problem: PAIN - ADULT  Goal: Verbalizes/displays adequate comfort level or baseline comfort level  Description: Interventions:  - Encourage patient to monitor pain and request assistance  - Assess pain using appropriate pain scale  - Administer analgesics based on type and severity of pain and evaluate response  - Implement non-pharmacological measures as appropriate and evaluate response  - Consider cultural and social influences on pain and pain management  - Notify physician/advanced practitioner if interventions unsuccessful or patient reports new pain  Outcome: Progressing     Problem: INFECTION - ADULT  Goal: Absence or prevention of progression during hospitalization  Description: INTERVENTIONS:  - Assess and monitor for signs and symptoms of infection  - Monitor lab/diagnostic results  - Monitor all insertion sites, i.e. indwelling lines, tubes, and drains  - Monitor endotracheal if appropriate and nasal secretions for changes in amount and color  - [place] appropriate cooling/warming therapies per order  - Administer medications as ordered  - Instruct and encourage patient and family to use good hand hygiene technique  - Identify and instruct in appropriate isolation precautions for identified infection/condition  Outcome: Progressing  Goal: Absence of fever/infection during neutropenic period  Description: INTERVENTIONS:  - Monitor WBC    Outcome: Progressing     Problem: SAFETY ADULT  Goal: Patient will remain free of falls  Description: INTERVENTIONS:  - Educate patient/family on patient safety including physical limitations  - Instruct patient to call for assistance with activity   - Consult OT/PT to assist with strengthening/mobility   - Keep Call bell within reach  - Keep bed low and locked with side rails adjusted as appropriate  - Keep care items and personal belongings within reach  - Initiate and maintain comfort rounds  Outcome: Progressing  Goal: Maintain or return to baseline ADL  function  Description: INTERVENTIONS:  -  Assess patient's ability to carry out ADLs; assess patient's baseline for ADL function and identify physical deficits which impact ability to perform ADLs (bathing, care of mouth/teeth, toileting, grooming, dressing, etc.)  - Assess/evaluate cause of self-care deficits   - Assess range of motion  - Assess patient's mobility; develop plan if impaired  - Assess patient's need for assistive devices and provide as appropriate  - Encourage maximum independence but intervene and supervise when necessary  - Involve family in performance of ADLs  - Assess for home care needs following discharge   - Consider OT consult to assist with ADL evaluation and planning for discharge  - Provide patient education as appropriate  Outcome: Progressing  Goal: Maintains/Returns to pre admission functional level  Description: INTERVENTIONS:  - Perform AM-PAC 6 Click Basic Mobility/ Daily Activity assessment daily.  - Set and communicate daily mobility goal to care team and patient/family/caregiver.   - Collaborate with rehabilitation services on mobility goals if consulted  - Out of bed for toileting  - Record patient progress and toleration of activity level   Outcome: Progressing     Problem: DISCHARGE PLANNING  Goal: Discharge to home or other facility with appropriate resources  Description: INTERVENTIONS:  - Identify barriers to discharge w/patient and caregiver  - Arrange for needed discharge resources and transportation as appropriate  - Identify discharge learning needs (meds, wound care, etc.)  - Arrange for interpretive services to assist at discharge as needed  - Refer to Case Management Department for coordinating discharge planning if the patient needs post-hospital services based on physician/advanced practitioner order or complex needs related to functional status, cognitive ability, or social support system  Outcome: Progressing     Problem: POSTPARTUM  Goal: Experiences normal  postpartum course  Description: INTERVENTIONS:  - Monitor maternal vital signs  - Assess uterine involution and lochia  Outcome: Progressing  Goal: Appropriate maternal -  bonding  Description: INTERVENTIONS:  - Identify family support  - Assess for appropriate maternal/infant bonding   -Encourage maternal/infant bonding opportunities  - Referral to  or  as needed  Outcome: Progressing  Goal: Establishment of infant feeding pattern  Description: INTERVENTIONS:  - Assess breast/bottle feeding  - Refer to lactation as needed  Outcome: Progressing  Goal: Incision(s), wounds(s) or drain site(s) healing without S/S of infection  Description: INTERVENTIONS  - Assess and document dressing, incision, wound bed, drain sites and surrounding tissue  - Provide patient and family education  Outcome: Progressing

## 2024-12-18 NOTE — ASSESSMENT & PLAN NOTE
- Pain well controlled with oral analgesics  - Voiding spontaneously  - Tolerating PO fluids and solids  - Ambulating without difficulty  - Contraception: ...  - Breastfeeding  - Anticipate discharge PPD#1-2

## 2024-12-18 NOTE — DISCHARGE SUMMARY
Discharge Summary - OB/GYN   Name: Veronica Lawrence y.o. female I MRN: [id number]  Unit/Bed#: -01 I Date of Admission: 12/16/2024   Date of Service: 12/18/2024 I Hospital Day: 2  { ?Quick Links I Problem List I PORCH I Billing Tip:26036}  Discharge Summary -   Veronica Pozo 39 y.o. female MRN: [id number]  Unit/Bed#: -01 Encounter: [id number]    Admission Date: 12/16/2024     Discharge Date: ***    Discharging Attending: Daily Wilkins MD    Principal Diagnosis: Term pregnancy  Induced labor  Single fetus    Secondary Diagnosis: Term pregnancy  Induced labor  Single fetus    Procedures: Spontaneous vaginal delivery    Hospital Course: Patient delivered and underwent normal post delivery care. She is ambulating well, voiding on her own, passing flatus, and tolerating oral intake.      Complications: None, ***    Condition at discharge: {condition:54738}     Discharge Medications:  Current Discharge Medication List        CONTINUE these medications which have NOT CHANGED    Details   Prenatal Vit-Fe Fumarate-FA (PRENATAL VITAMIN PO) Take by mouth             Discharge instructions/Information to patient and family:   See after visit summary for information provided to patient and family.      Provisions for Follow-Up Care:  See after visit summary for information related to follow-up care and any pertinent home health orders.      Disposition: See After Visit Summary for discharge disposition information.    Planned Readmission: Mar Wilkins MD  12/18/2024  1:23 AM

## 2024-12-18 NOTE — PLAN OF CARE
Problem: PAIN - ADULT  Goal: Verbalizes/displays adequate comfort level or baseline comfort level  Description: Interventions:  - Encourage patient to monitor pain and request assistance  - Assess pain using appropriate pain scale  - Administer analgesics based on type and severity of pain and evaluate response  - Implement non-pharmacological measures as appropriate and evaluate response  - Consider cultural and social influences on pain and pain management  - Notify physician/advanced practitioner if interventions unsuccessful or patient reports new pain  12/18/2024 0248 by Stephanie Gonzalez RN  Outcome: Progressing  12/17/2024 2004 by Stephanie Gonzalez RN  Outcome: Progressing     Problem: INFECTION - ADULT  Goal: Absence or prevention of progression during hospitalization  Description: INTERVENTIONS:  - Assess and monitor for signs and symptoms of infection  - Monitor lab/diagnostic results  - Monitor all insertion sites, i.e. indwelling lines, tubes, and drains  - Monitor endotracheal if appropriate and nasal secretions for changes in amount and color  - [place] appropriate cooling/warming therapies per order  - Administer medications as ordered  - Instruct and encourage patient and family to use good hand hygiene technique  - Identify and instruct in appropriate isolation precautions for identified infection/condition  12/18/2024 0248 by Stephanie Gonzalez RN  Outcome: Progressing  12/17/2024 2004 by Stephanie Gonzalez RN  Outcome: Progressing  Goal: Absence of fever/infection during neutropenic period  Description: INTERVENTIONS:  - Monitor WBC    12/18/2024 0248 by Stephanie Gonzalez RN  Outcome: Progressing  12/17/2024 2004 by Stephanie Gonzalez RN  Outcome: Progressing     Problem: SAFETY ADULT  Goal: Patient will remain free of falls  Description: INTERVENTIONS:  - Educate patient/family on patient safety including physical limitations  - Instruct patient to call for assistance with activity   - Consult OT/PT to assist with  strengthening/mobility   - Keep Call bell within reach  - Keep bed low and locked with side rails adjusted as appropriate  - Keep care items and personal belongings within reach  - Initiate and maintain comfort rounds  - Apply yellow socks and bracelet for high fall risk patients  - Consider moving patient to room near nurses station  12/18/2024 0248 by Stephanie Gonzalez RN  Outcome: Progressing  12/17/2024 2004 by Stephanie Gonzalez RN  Outcome: Progressing  Goal: Maintain or return to baseline ADL function  Description: INTERVENTIONS:  -  Assess patient's ability to carry out ADLs; assess patient's baseline for ADL function and identify physical deficits which impact ability to perform ADLs (bathing, care of mouth/teeth, toileting, grooming, dressing, etc.)  - Assess/evaluate cause of self-care deficits   - Assess range of motion  - Assess patient's mobility; develop plan if impaired  - Assess patient's need for assistive devices and provide as appropriate  - Encourage maximum independence but intervene and supervise when necessary  - Involve family in performance of ADLs  - Assess for home care needs following discharge   - Consider OT consult to assist with ADL evaluation and planning for discharge  - Provide patient education as appropriate  12/18/2024 0248 by Stephanie Gonzalez RN  Outcome: Progressing  12/17/2024 2004 by Stephanie Gonzalez RN  Outcome: Progressing  Goal: Maintains/Returns to pre admission functional level  Description: INTERVENTIONS:  - Perform AM-PAC 6 Click Basic Mobility/ Daily Activity assessment daily.  - Set and communicate daily mobility goal to care team and patient/family/caregiver.   - Collaborate with rehabilitation services on mobility goals if consulted  - Out of bed for toileting  - Record patient progress and toleration of activity level   12/18/2024 0248 by Stephanie Gonzalez RN  Outcome: Progressing  12/17/2024 2004 by Stephanie Gonzalez RN  Outcome: Progressing     Problem: DISCHARGE PLANNING  Goal:  Discharge to home or other facility with appropriate resources  Description: INTERVENTIONS:  - Identify barriers to discharge w/patient and caregiver  - Arrange for needed discharge resources and transportation as appropriate  - Identify discharge learning needs (meds, wound care, etc.)  - Arrange for interpretive services to assist at discharge as needed  - Refer to Case Management Department for coordinating discharge planning if the patient needs post-hospital services based on physician/advanced practitioner order or complex needs related to functional status, cognitive ability, or social support system  20248 by Stephanie Gonzalez, RN  Outcome: Progressing  2024 by Stephanie Gonzalez, RN  Outcome: Progressing     Problem: POSTPARTUM  Goal: Experiences normal postpartum course  Description: INTERVENTIONS:  - Monitor maternal vital signs  - Assess uterine involution and lochia  Outcome: Progressing  Goal: Appropriate maternal -  bonding  Description: INTERVENTIONS:  - Identify family support  - Assess for appropriate maternal/infant bonding   -Encourage maternal/infant bonding opportunities  - Referral to  or  as needed  Outcome: Progressing  Goal: Establishment of infant feeding pattern  Description: INTERVENTIONS:  - Assess breast/bottle feeding  - Refer to lactation as needed  Outcome: Progressing  Goal: Incision(s), wounds(s) or drain site(s) healing without S/S of infection  Description: INTERVENTIONS  - Assess and document dressing, incision, wound bed, drain sites and surrounding tissue  - Provide patient and family education  Outcome: Progressing

## 2024-12-18 NOTE — PLAN OF CARE
Problem: Knowledge Deficit  Goal: Verbalizes understanding of labor plan  Description: Assess patient/family/caregiver's baseline knowledge level and ability to understand information.  Provide education via patient/family/caregiver's preferred learning method at appropriate level of understanding.     1. Provide teaching at level of understanding.  2. Provide teaching via preferred learning method(s).  Outcome: Progressing  Goal: Patient/family/caregiver demonstrates understanding of disease process, treatment plan, medications, and discharge instructions  Description: Complete learning assessment and assess knowledge base.  Interventions:  - Provide teaching at level of understanding  - Provide teaching via preferred learning methods  Outcome: Progressing     Problem: Labor & Delivery  Goal: Manages discomfort  Description: Assess and monitor for signs and symptoms of discomfort.  Assess patient's pain level regularly and per hospital policy.  Administer medications as ordered. Support use of nonpharmacological methods to help control pain such as distraction, imagery, relaxation, and application of heat and cold.  Collaborate with interdisciplinary team and patient to determine appropriate pain management plan.    1. Include patient in decisions related to comfort.  2. Offer non-pharmacological pain management interventions.  3. Report ineffective pain management to physician.  Outcome: Progressing  Goal: Patient vital signs are stable  Description: 1. Assess vital signs - vaginal delivery.  Outcome: Progressing     Problem: PAIN - ADULT  Goal: Verbalizes/displays adequate comfort level or baseline comfort level  Description: Interventions:  - Encourage patient to monitor pain and request assistance  - Assess pain using appropriate pain scale  - Administer analgesics based on type and severity of pain and evaluate response  - Implement non-pharmacological measures as appropriate and evaluate response  -  Consider cultural and social influences on pain and pain management  - Notify physician/advanced practitioner if interventions unsuccessful or patient reports new pain  Outcome: Progressing     Problem: INFECTION - ADULT  Goal: Absence or prevention of progression during hospitalization  Description: INTERVENTIONS:  - Assess and monitor for signs and symptoms of infection  - Monitor lab/diagnostic results  - Monitor all insertion sites, i.e. indwelling lines, tubes, and drains  - Monitor endotracheal if appropriate and nasal secretions for changes in amount and color  - [place] appropriate cooling/warming therapies per order  - Administer medications as ordered  - Instruct and encourage patient and family to use good hand hygiene technique  - Identify and instruct in appropriate isolation precautions for identified infection/condition  Outcome: Progressing  Goal: Absence of fever/infection during neutropenic period  Description: INTERVENTIONS:  - Monitor WBC    Outcome: Progressing     Problem: SAFETY ADULT  Goal: Patient will remain free of falls  Description: INTERVENTIONS:  - Educate patient/family on patient safety including physical limitations  - Instruct patient to call for assistance with activity   - Consult OT/PT to assist with strengthening/mobility   - Keep Call bell within reach  - Keep bed low and locked with side rails adjusted as appropriate  - Keep care items and personal belongings within reach  - Initiate and maintain comfort rounds  - Apply yellow socks and bracelet for high fall risk patients  - Consider moving patient to room near nurses station  Outcome: Progressing  Goal: Maintain or return to baseline ADL function  Description: INTERVENTIONS:  -  Assess patient's ability to carry out ADLs; assess patient's baseline for ADL function and identify physical deficits which impact ability to perform ADLs (bathing, care of mouth/teeth, toileting, grooming, dressing, etc.)  - Assess/evaluate cause  of self-care deficits   - Assess range of motion  - Assess patient's mobility; develop plan if impaired  - Assess patient's need for assistive devices and provide as appropriate  - Encourage maximum independence but intervene and supervise when necessary  - Involve family in performance of ADLs  - Assess for home care needs following discharge   - Consider OT consult to assist with ADL evaluation and planning for discharge  - Provide patient education as appropriate  Outcome: Progressing  Goal: Maintains/Returns to pre admission functional level  Description: INTERVENTIONS:  - Perform AM-PAC 6 Click Basic Mobility/ Daily Activity assessment daily.  - Set and communicate daily mobility goal to care team and patient/family/caregiver.   - Collaborate with rehabilitation services on mobility goals if consulted  - Out of bed for toileting  - Record patient progress and toleration of activity level   Outcome: Progressing     Problem: DISCHARGE PLANNING  Goal: Discharge to home or other facility with appropriate resources  Description: INTERVENTIONS:  - Identify barriers to discharge w/patient and caregiver  - Arrange for needed discharge resources and transportation as appropriate  - Identify discharge learning needs (meds, wound care, etc.)  - Arrange for interpretive services to assist at discharge as needed  - Refer to Case Management Department for coordinating discharge planning if the patient needs post-hospital services based on physician/advanced practitioner order or complex needs related to functional status, cognitive ability, or social support system  Outcome: Progressing

## 2024-12-18 NOTE — PLAN OF CARE
Problem: PAIN - ADULT  Goal: Verbalizes/displays adequate comfort level or baseline comfort level  Description: Interventions:  - Encourage patient to monitor pain and request assistance  - Assess pain using appropriate pain scale  - Administer analgesics based on type and severity of pain and evaluate response  - Implement non-pharmacological measures as appropriate and evaluate response  - Consider cultural and social influences on pain and pain management  - Notify physician/advanced practitioner if interventions unsuccessful or patient reports new pain  Outcome: Progressing     Problem: INFECTION - ADULT  Goal: Absence or prevention of progression during hospitalization  Description: INTERVENTIONS:  - Assess and monitor for signs and symptoms of infection  - Monitor lab/diagnostic results  - Monitor all insertion sites, i.e. indwelling lines, tubes, and drains  - Monitor endotracheal if appropriate and nasal secretions for changes in amount and color  - [place] appropriate cooling/warming therapies per order  - Administer medications as ordered  - Instruct and encourage patient and family to use good hand hygiene technique  - Identify and instruct in appropriate isolation precautions for identified infection/condition  Outcome: Progressing  Goal: Absence of fever/infection during neutropenic period  Description: INTERVENTIONS:  - Monitor WBC    Outcome: Progressing     Problem: SAFETY ADULT  Goal: Patient will remain free of falls  Description: INTERVENTIONS:  - Educate patient/family on patient safety including physical limitations  - Instruct patient to call for assistance with activity   - Consult OT/PT to assist with strengthening/mobility   - Keep Call bell within reach  - Keep bed low and locked with side rails adjusted as appropriate  - Keep care items and personal belongings within reach  - Initiate and maintain comfort rounds  - Make Fall Risk Sign visible to staff  - Apply yellow socks and bracelet  for high fall risk patients  - Consider moving patient to room near nurses station  Outcome: Progressing  Goal: Maintain or return to baseline ADL function  Description: INTERVENTIONS:  -  Assess patient's ability to carry out ADLs; assess patient's baseline for ADL function and identify physical deficits which impact ability to perform ADLs (bathing, care of mouth/teeth, toileting, grooming, dressing, etc.)  - Assess/evaluate cause of self-care deficits   - Assess range of motion  - Assess patient's mobility; develop plan if impaired  - Assess patient's need for assistive devices and provide as appropriate  - Encourage maximum independence but intervene and supervise when necessary  - Involve family in performance of ADLs  - Assess for home care needs following discharge   - Consider OT consult to assist with ADL evaluation and planning for discharge  - Provide patient education as appropriate  Outcome: Progressing  Goal: Maintains/Returns to pre admission functional level  Description: INTERVENTIONS:  - Perform AM-PAC 6 Click Basic Mobility/ Daily Activity assessment daily.  - Set and communicate daily mobility goal to care team and patient/family/caregiver.   - Collaborate with rehabilitation services on mobility goals if consulted  - Out of bed for toileting  - Record patient progress and toleration of activity level   Outcome: Progressing     Problem: DISCHARGE PLANNING  Goal: Discharge to home or other facility with appropriate resources  Description: INTERVENTIONS:  - Identify barriers to discharge w/patient and caregiver  - Arrange for needed discharge resources and transportation as appropriate  - Identify discharge learning needs (meds, wound care, etc.)  - Arrange for interpretive services to assist at discharge as needed  - Refer to Case Management Department for coordinating discharge planning if the patient needs post-hospital services based on physician/advanced practitioner order or complex needs  related to functional status, cognitive ability, or social support system  Outcome: Progressing     Problem: POSTPARTUM  Goal: Experiences normal postpartum course  Description: INTERVENTIONS:  - Monitor maternal vital signs  - Assess uterine involution and lochia  Outcome: Progressing  Goal: Appropriate maternal -  bonding  Description: INTERVENTIONS:  - Identify family support  - Assess for appropriate maternal/infant bonding   -Encourage maternal/infant bonding opportunities  - Referral to  or  as needed  Outcome: Progressing  Goal: Establishment of infant feeding pattern  Description: INTERVENTIONS:  - Assess breast/bottle feeding  - Refer to lactation as needed  Outcome: Progressing  Goal: Incision(s), wounds(s) or drain site(s) healing without S/S of infection  Description: INTERVENTIONS  - Assess and document dressing, incision, wound bed, drain sites and surrounding tissue  - Provide patient and family education  Outcome: Progressing

## 2024-12-18 NOTE — OB LABOR/OXYTOCIN SAFETY PROGRESS
Oxytocin Safety Progress Check Note - Veronica Pozo 39 y.o. female MRN: [id number]    Unit/Bed#: -01 Encounter: [id number]    Dose (kee-units/min) Oxytocin: 8 kee-units/min  Contraction Frequency (minutes): 2-3  Contraction Intensity: Moderate  Uterine Activity Characteristics: Irregular  Cervical Dilation: Lip/rim (Comment)        Cervical Effacement: 90  Fetal Station: 0  Baseline Rate (FHR): 160 bpm  Fetal Heart Rate (FHT): 202 BPM  FHR Category: Category i         Vital Signs:   Vitals:    12/17/24 2215   BP: 108/58   Pulse: (!) 109   Resp:    Temp:    SpO2:        Notes/comments:   Patient overall comfortable, continue to monitor and manage.    Daily Wilkins MD 12/17/2024 10:31 PM

## 2024-12-18 NOTE — ANESTHESIA POSTPROCEDURE EVALUATION
Post-Op Assessment Note    CV Status:  Stable  Pain Score: 0    Pain management: adequate      Post-op block assessment: catheter intact and no complications   Mental Status:  Alert and awake   Hydration Status:  Euvolemic   PONV Controlled:  Controlled   Airway Patency:  Patent     Post Op Vitals Reviewed: Yes    No anethesia notable event occurred.    Staff: CRNA   Comments: epidural catheter tip intact following removal        Last Filed PACU Vitals:  Vitals Value Taken Time   Temp     Pulse     BP     Resp     SpO2         Modified [place]:  No data recorded

## 2024-12-18 NOTE — OB LABOR/OXYTOCIN SAFETY PROGRESS
Oxytocin Safety Progress Check Note - Veronica Pozo 39 y.o. female MRN: [id number]    Unit/Bed#: -01 Encounter: [id number]    Dose (kee-units/min) Oxytocin: 8 kee-units/min  Contraction Frequency (minutes): 2-3.5  Contraction Intensity: Moderate  Uterine Activity Characteristics: Irregular  Cervical Dilation: 10  Dilation Complete Date: 24  Dilation Complete Time:   Cervical Effacement: 100  Fetal Station: 1  Baseline Rate (FHR): 170 bpm  Fetal Heart Rate (FHT): 202 BPM  FHR Category: 2               Vital Signs:   Vitals:    24 2345   BP: 109/60   Pulse:    Resp:    Temp:    SpO2:        Notes/comments:   SVE as noted above. Complete/+1 and in JOSUE. Anticipate  soon. Dr. Wilkins made aware.     Steve Valencia MD 2024 11:55 PM

## 2024-12-18 NOTE — UTILIZATION REVIEW
Initial Clinical Review    Admission: Date/Time/Statement:   Admission Orders (From admission, onward)       Ordered        12/16/24 2131  Inpatient Admission  Once                          Orders Placed This Encounter   Procedures    Inpatient Admission     Standing Status:   Standing     Number of Occurrences:   1     Level of Care:   Med Surg [16]     Estimated length of stay:   More than 2 Midnights     Certification:   I certify that inpatient services are medically necessary for this patient for a duration of greater than two midnights. See H&P and MD Progress Notes for additional information about the patient's course of treatment.       Chief Complaint   Patient presents with    Laboring     Rule out rupture, rule out rupture. Sent from office       Initial Presentation: 39 y.o. female presented to L&D as inpatient admission for induction of labor for non-reassuring fetal heart tones. G 1 @ 40w 5 d. Plan continuous external monitoring, IVF,cytotec hill balloon, IV pitocin if needed Epidural and IV MGSO 4 if needed and supportive care   SVE: 1/80/-2  Continuous fetal monitoring and tocometry   FHT cat 2. Baseline 150 bpm with moderate variability with presence of 15x15 accelerations, and one variable deceleration noted    12-17-24   @ 0029  Hill balloon inserted  Cervical Dilation: 1  Cervical Effacement: 80  Fetal Station: -2  Fetal Heart Rate (FHT): 150 BPM  FHR Category: 1    @ 0513  IV Pitocin started   Dose (kee-units/min) Oxytocin: 2 kee-units/min  Contraction Frequency (minutes): 3-5  Contraction Intensity: Moderate  Uterine Activity Characteristics: Irregular  Cervical Dilation: 1  Cervical Effacement: 80  Fetal Station: -2  Baseline Rate (FHR): 150 bpm  Fetal Heart Rate (FHT): 148 BPM  FHR Category: 1    @ 0637  Hill balloon dislodged  AROM   Dose (kee-units/min) Oxytocin: 2 kee-units/min  Contraction Frequency (minutes): 2-4.5  Contraction Intensity: Moderate  Uterine Activity  Characteristics: Irregular  Cervical Dilation: 5  Cervical Effacement: 80  Fetal Station: -1  Baseline Rate (FHR): 160 bpm  Fetal Heart Rate (FHT): 148 BPM  FHR Category: 1    @ 0800  Dose (kee-units/min) Oxytocin: 4 kee-units/min  Contraction Frequency (minutes): 2-4  Contraction Intensity: Moderate  Uterine Activity Characteristics: Irregular  Cervical Dilation: 6  Cervical Effacement: 90  Fetal Station: -1  Baseline Rate (FHR): 145 bpm  Fetal Heart Rate (FHT): 148 BPM  FHR Category: Category I    @ 1010   Epidural placed     @ 1258   Dose (kee-units/min) Oxytocin: 4 kee-units/min  Contraction Frequency (minutes): 1.5-3  Contraction Intensity: Moderate/Strong  Uterine Activity Characteristics: Irregular  Cervical Dilation: 8  Cervical Effacement: 90  Fetal Station: -1  Baseline Rate (FHR): 150 bpm  Fetal Heart Rate (FHT): 132 BPM  FHR Category: I    @ 1415  Dose (kee-units/min) Oxytocin: 8 kee-units/min  Contraction Frequency (minutes): 2-4  Contraction Intensity: Moderate/Strong  Uterine Activity Characteristics: Irregular  Cervical Dilation: 9  Cervical Effacement: 90  Fetal Station: 0  Baseline Rate (FHR): 155 bpm  Fetal Heart Rate (FHT): 132 BPM  FHR Category: II  FHT: 140's moderate variability, 2 min deceleration after drop in maternal bp, resolved with position change and ephedrine.  Accelerations.    @ 2231  Dose (kee-units/min) Oxytocin: 8 kee-units/min  Contraction Frequency (minutes): 2-3  Contraction Intensity: Moderate  Uterine Activity Characteristics: Irregular  Cervical Dilation: Lip/rim (Comment)  Cervical Effacement: 90  Fetal Station: 0  Baseline Rate (FHR): 160 bpm  Fetal Heart Rate (FHT): 202 BPM  FHR Category: Category i    @ 2353  Dose (kee-units/min) Oxytocin: 8 kee-units/min  Contraction Frequency (minutes): 2-3.5  Contraction Intensity: Moderate  Uterine Activity Characteristics: Irregular  Cervical Dilation: 10  Dilation Complete Date: 12/17/24  Dilation Complete  Time: 7  Cervical Effacement: 100  Fetal Station: 1  Baseline Rate (FHR): 170 bpm  Fetal Heart Rate (FHT): 202 BPM  FHR Category: 2    -  SAVD @ 41 W   MALE  APGAR 8  9  WT 3663 GRAMS  Infant dried suctioned stimulated and warmed. Responded well and taken to NBN     Scheduled Medications:  carboprost, , ,       Continuous IV Infusions:  ropivacaine 0.2%, , Epidural, Continuous      PRN Meds:  acetaminophen, 650 mg, Oral, Q6H PRN  benzocaine-menthol-lanolin-aloe, 1 Application, Topical, Q6H PRN  carboprost, , ,   docusate sodium, 100 mg, Oral, BID PRN  hydrocortisone, 1 Application, Topical, Daily PRN  ibuprofen, 600 mg, Oral, Q6H PRN  witch hazel-glycerin, 1 Pad, Topical, Q4H PRN      Amitting  Vitals [24 1849]   Temperature Pulse Respirations Blood Pressure SpO2 Pain Score   97.6 °F (36.4 °C) 88 20 110/70 99 % No Pain     Weight (last 2 days)       Date/Time Weight    24 1347 --    Comment rows:    OBSERV: Dr Andujar aware of patients HR fluctuation. from 90's -150's. No new orders at this time.pt asymptomatic at 24 1347    24 1033 --    Comment rows:    OBSERV: dr louie aware of pts intermittent increase in HR at 24 1033    24 0855 --    Comment rows:    OBSERV: dr nunez at bedside to discuss plan care with pt due to her pain level at this time. Pt requesting iv pain medication at 24 0855    24 0755 --    Comment rows:    OBSERV: SVE/AROM by dr andujar at 24 0755    24 0739 69.9 (154)    24 --    Comment rows:    OBSERV: pt agrees to stay for iol , emotional support given at 24 2326    24 --    Comment rows:    OBSERV: Dr Serrano in room to discuss with pt Induction process, pt may want to go home at 24 232            Vital Signs (last 3 days)       Date/Time Temp Pulse Resp BP SpO2 O2 Device Cardiac (WDL) Patient Position - Orthostatic VS Pain    24 0900 -- -- -- -- -- None (Room air) WDL -- 2    24  0756 97.9 °F (36.6 °C) 87 16 88/56 99 % -- -- -- 2    12/18/24 0532 -- -- -- -- -- -- -- -- 2    12/18/24 0347 98.2 °F (36.8 °C) 79 16 113/67 98 % None (Room air) WDL Sitting No Pain    12/18/24 0300 -- -- -- -- -- -- WDL -- --    12/18/24 0245 -- -- -- 95/52 -- -- -- -- --    12/18/24 0230 99.4 °F (37.4 °C) -- -- 102/53 -- -- -- -- --    12/18/24 0215 -- -- -- 98/68 -- -- -- -- --    12/18/24 0200 -- -- -- 106/91 -- -- -- -- --    12/18/24 0148 -- -- -- -- -- -- -- -- No Pain    12/18/24 0145 -- -- -- 127/58 -- -- -- -- --    12/18/24 0130 -- -- -- 112/53 -- -- -- -- --    12/18/24 0115 -- 106 -- 110/59 -- -- -- -- No Pain    12/18/24 0025 100.7 °F (38.2 °C) 164 -- 91/51 98 % -- -- -- --    12/18/24 0015 -- 135 -- -- -- -- -- -- --    12/17/24 2353 98.8 °F (37.1 °C) 105 -- 117/61 -- -- -- -- --    12/17/24 2345 -- -- -- 109/60 -- -- -- -- --    12/17/24 2330 -- -- -- 108/63 -- -- -- -- --    12/17/24 2315 -- 98 -- 114/58 -- -- -- -- --    12/17/24 2300 -- -- -- 110/61 -- -- -- -- --    12/17/24 2251 99.8 °F (37.7 °C) 95 -- 108/62 -- -- -- -- --    12/17/24 2249 -- 96 -- 112/63 -- -- -- -- --    12/17/24 2245 -- 102 -- 78/50 -- -- -- -- --    12/17/24 2241 -- 93 -- 82/47 -- -- -- -- --    12/17/24 2240 98.4 °F (36.9 °C) -- -- -- -- -- -- -- --    12/17/24 2230 -- -- -- 121/57 -- -- -- -- --    12/17/24 2215 -- 109 -- 108/58 -- -- -- -- --    12/17/24 2200 98.2 °F (36.8 °C) -- -- 108/57 -- -- -- -- --    12/17/24 2145 -- -- -- 106/59 -- -- -- -- --    12/17/24 2130 -- -- -- 110/61 -- -- -- -- --    12/17/24 2115 -- -- -- 109/66 -- -- -- -- --    12/17/24 2045 -- -- -- 100/55 -- -- -- -- --    12/17/24 2030 -- 106 -- 108/66 -- -- -- -- --    12/17/24 2015 -- 94 -- 106/68 -- -- -- -- --    12/17/24 2000 -- -- -- 102/53 -- -- -- -- --    12/17/24 1945 -- -- -- 105/60 -- -- -- -- --    12/17/24 1930 98.5 °F (36.9 °C) -- -- 101/56 -- -- -- -- --    12/17/24 1855 -- 96 -- 108/65 -- -- -- -- --    12/17/24 1840 -- 100 --  110/68 -- -- -- -- --    12/17/24 1824 -- 110 -- 108/67 -- -- -- -- No Pain    12/17/24 1809 -- 100 -- 103/66 -- -- -- -- --    12/17/24 1755 -- 106 -- 111/72 -- -- -- -- --    12/17/24 1739 -- 107 -- 115/67 -- -- -- -- --    12/17/24 1725 -- 106 -- 119/70 -- -- -- -- --    12/17/24 1710 99.4 °F (37.4 °C) 125 20 131/66 -- -- -- -- No Pain    12/17/24 1641 -- 151 -- 103/69 -- -- -- -- --    12/17/24 1609 -- 164 -- 98/60 -- -- -- -- --    12/17/24 1600 98.8 °F (37.1 °C) -- -- -- -- -- -- -- --    12/17/24 1554 -- 150 -- 109/64 -- -- -- -- --    12/17/24 1540 -- 137 -- 111/63 -- -- -- -- --    12/17/24 1539 -- 137 -- 111/63 -- -- -- -- --    12/17/24 1526 -- 109 -- 108/56 -- -- -- -- --    12/17/24 1506 -- 99 -- 98/50 -- -- -- -- --    12/17/24 1503 -- 94 -- 107/56 -- -- -- -- --    12/17/24 1500 -- 105 -- 105/55 -- -- -- -- --    12/17/24 1455 98.7 °F (37.1 °C) 99 20 -- -- -- -- -- --    12/17/24 1454 -- 94 -- 105/59 -- -- -- -- --    12/17/24 1451 -- 116 -- 96/54 -- -- -- -- --    12/17/24 1448 -- 100 -- 95/51 -- -- -- -- --    12/17/24 1445 -- 92 -- 93/50 -- -- -- -- --    12/17/24 1444 -- 108 -- 106/60 -- -- -- -- --    12/17/24 1443 -- 91 -- -- -- -- -- -- --    12/17/24 1442 -- 80 -- 118/64 -- -- -- -- --    12/17/24 1441 -- 106 -- 120/62 -- -- -- -- --    12/17/24 1439 -- 105 -- -- -- -- -- -- --    12/17/24 1435 -- 117 -- -- -- -- -- -- --    12/17/24 1432 -- 120 -- 69/47 -- -- -- -- --    12/17/24 1351 98.7 °F (37.1 °C) -- 20 -- -- -- -- -- --    12/17/24 1347 -- 150 -- 102/54 -- -- -- -- --    OBSERV: Dr Wilkins aware of patients HR fluctuation. from 90's -150's. No new orders at this time.pt asymptomatic at 12/17/24 1347    12/17/24 1250 98.3 °F (36.8 °C) -- -- -- -- -- -- -- --    12/17/24 1247 -- 78 -- 81/47 -- -- -- -- --    12/17/24 1243 -- 76 -- -- -- -- -- -- --    12/17/24 1239 -- 79 -- -- -- -- -- -- --    12/17/24 1235 -- 91 -- -- -- -- -- -- --    12/17/24 1233 -- 83 -- 98/57 -- -- -- -- --     12/17/24 1231 -- 116 -- -- -- -- -- -- --    12/17/24 1227 -- 100 -- -- -- -- -- -- --    12/17/24 1223 -- 79 -- -- -- -- -- -- --    12/17/24 1219 -- 87 -- -- -- -- -- -- --    12/17/24 1218 -- 82 -- 100/61 -- -- -- -- --    12/17/24 1147 -- 95 -- 100/62 -- -- -- -- --    12/17/24 1143 -- 82 -- -- -- -- -- -- --    12/17/24 1139 -- 85 -- -- -- -- -- -- --    12/17/24 1135 -- 87 -- -- -- -- -- -- --    12/17/24 1133 -- 82 -- 111/63 -- -- -- -- --    12/17/24 1131 -- 80 -- -- -- -- -- -- --    12/17/24 1127 -- 88 -- -- -- -- -- -- --    12/17/24 1123 -- 80 -- -- -- -- -- -- --    12/17/24 1119 -- 75 -- -- -- -- -- -- --    12/17/24 1118 -- 67 -- 107/62 -- -- -- -- --    12/17/24 1115 -- 64 -- -- -- -- -- -- --    12/17/24 1111 -- 81 -- -- -- -- -- -- --    12/17/24 1110 98.5 °F (36.9 °C) 81 -- 95/59 -- -- -- -- --    12/17/24 1047 -- 116 -- 99/58 -- -- -- -- --    12/17/24 1044 -- 108 -- 108/57 -- -- -- -- --    12/17/24 1037 -- 109 -- 95/54 -- -- -- -- --    12/17/24 1035 -- 126 -- 84/48 -- -- -- -- --    12/17/24 1033 -- 130 -- 107/55 -- -- -- -- --    OBSERV: dr louie aware of pts intermittent increase in HR at 12/17/24 1033    12/17/24 1029 -- 89 -- 115/63 -- -- -- -- --    12/17/24 1026 -- 71 -- 111/61 -- -- -- -- No Pain    12/17/24 1025 -- -- -- -- -- -- -- -- No Pain    12/17/24 1024 -- 81 -- -- 94 % -- -- -- --    12/17/24 1023 -- 127 20 84/54 -- -- -- Lying --    12/17/24 1020 -- 80 -- 103/57 -- -- -- -- --    12/17/24 1015 -- 68 -- -- -- -- -- -- --    12/17/24 1014 -- 68 -- 111/61 96 % -- -- -- --    12/17/24 1011 -- 86 -- -- -- -- -- -- --    12/17/24 1009 -- 77 -- -- 94 % -- -- -- --    12/17/24 1007 -- 85 -- -- -- -- -- -- --    12/17/24 1003 -- 74 -- -- -- -- -- -- --    12/17/24 1000 97.8 °F (36.6 °C) 69 20 114/67 -- -- -- -- --    12/17/24 0931 -- 68 -- -- -- -- -- -- --    12/17/24 0929 -- 67 -- 117/68 -- -- -- -- --    12/17/24 0902 -- -- -- -- -- -- -- -- 9    12/17/24 0855 -- -- -- -- -- -- --  -- 9    OBSERV: dr nunez at bedside to discuss plan care with pt due to her pain level at this time. Pt requesting iv pain medication at 12/17/24 0855    12/17/24 0845 -- 69 -- 129/62 -- -- -- -- --    12/17/24 0815 -- 73 -- 116/69 -- -- -- -- --    12/17/24 0800 -- 82 -- 117/68 -- -- -- -- --    12/17/24 0755 -- -- -- -- -- -- -- -- --    OBSERV: SVE/AROM by dr andujar at 12/17/24 0755    12/17/24 0744 -- 76 -- 106/55 -- -- -- -- --    12/17/24 0739 98 °F (36.7 °C) 82 20 117/56 -- -- -- -- 7    12/17/24 0700 -- 66 -- 129/63 -- -- -- -- --    12/17/24 0636 -- 63 -- 98/51 -- -- -- -- --    12/17/24 0630 -- 71 -- 98/51 -- -- -- -- --    12/17/24 0615 -- 73 -- 91/53 -- -- -- -- --    12/17/24 0600 -- 72 -- 95/52 -- -- -- -- --    12/17/24 0545 -- 64 -- 99/56 -- -- -- -- --    12/17/24 0530 -- 84 -- 102/54 -- -- -- -- --    12/17/24 0515 -- 68 -- 96/55 -- -- -- -- --    12/17/24 0500 -- 65 -- 97/57 -- -- -- -- --    12/17/24 0445 97.8 °F (36.6 °C) 73 -- 99/60 -- -- -- -- --    12/17/24 0430 -- 66 -- 99/58 -- -- -- -- --    12/17/24 0415 -- 112 -- 106/65 -- -- -- -- --    12/17/24 0405 -- 75 -- 121/74 -- -- -- -- --    12/17/24 0400 -- 84 -- -- -- -- -- -- --    12/17/24 0345 -- 62 -- 116/71 -- -- -- -- --    12/17/24 0330 -- 68 -- 118/71 -- -- -- -- --    12/17/24 0315 -- 65 -- 118/65 -- -- -- -- --    12/17/24 0300 -- 72 -- 114/64 -- -- -- -- --    12/17/24 0245 -- 62 -- 138/79 -- -- -- -- --    12/17/24 0206 -- 67 -- 123/79 -- -- -- -- --    12/17/24 0030 -- -- -- 117/55 -- -- -- -- --    12/16/24 2326 -- -- -- -- -- -- -- -- --    OBSERV: pt agrees to stay for iol , emotional support given at 12/16/24 2326 12/16/24 2322 -- -- -- -- -- -- -- -- --    OBSERV: Dr Serrano in room to discuss with pt Induction process, pt may want to go home at 12/16/24 2322 12/16/24 1849 97.6 °F (36.4 °C) 88 20 110/70 99 % -- -- Lying No Pain              Pertinent Labs/Diagnostic Test Results:   Radiology:  No orders to display      Cardiology:  ECG 12 lead   Final Result by Krystian Jenkins MD (12/18 0905)   Sinus tachycardia   Nonspecific ST abnormality   Abnormal ECG   No previous ECGs available   Confirmed by Krystian Jenkins ([id number]) on 12/18/2024 9:05:31 AM              Results from last 7 days   Lab Units 12/16/24  2354   WBC Thousand/uL 9.56   HEMOGLOBIN g/dL 12.0   HEMATOCRIT % 36.1   PLATELETS Thousands/uL 247   TOTAL NEUT ABS Thousands/µL 7.09               Results from last 7 days   Lab Units 12/16/24  2354   HEP B S AG  Non-reactive   HEP C AB  Non-reactive           Past Medical History:   Diagnosis Date    Migraine     occas , non with this pregnancy    Varicella     pt had chickenpox childhodd and also vaccine     Present on Admission:  **None**      Admitting Diagnosis: Amniotic fluid leaking [O42.90]  40 weeks gestation of pregnancy [Z3A.40]  Encounter for full-term uncomplicated delivery [O80]  Age/Sex: 39 y.o. female    Network Utilization Review Department  ATTENTION: Please call with any questions or concerns to [phone] and carefully listen to the prompts so that you are directed to the right person. All voicemails are confidential.   For Discharge needs, contact Care Management DC Support Team at [phone] opt. [phone]  Send all requests for admission clinical reviews, approved or denied determinations and any other requests to dedicated fax number below belonging to the campus where the patient is receiving treatment. List of dedicated fax numbers for the Facilities:  FACILITY NAME UR FAX NUMBER   ADMISSION DENIALS (Administrative/Medical Necessity) [phone]   DISCHARGE SUPPORT TEAM (NETWORK) [phone]   PARENT CHILD HEALTH (Maternity/NICU/Pediatrics) [phone]   [facility] [phone]   [facility] [phone]   [facility] [phone]   [facility] [phone]   [facility]  [phone]   [facility] [phone]   [facility] [phone]   [facility] [phone]   [facility] [phone]   [facility] [phone]   [facility] [phone]   [facility] [phone]

## 2024-12-18 NOTE — CASE MANAGEMENT
Case Management Progress Note    Patient name Veronica Pozo  Location /-01 MRN [id number]  : 1985 Date 2024       LOS (days): 2  Geometric Mean LOS (GMLOS) (days):   Days to GMLOS:        OBJECTIVE:        Current admission status: Inpatient  Preferred Pharmacy:   [facility]/pharmacy #[id number] - [place] - [address]  [address] [place] [address]  Phone: [phone] Fax: [phone]    Primary Care Provider: No primary care provider on file.    Primary Insurance: BLUE CROSS  Secondary Insurance:     PROGRESS NOTE:      CM met with MOB to introduce CM services, complete assessment, and provide CM contact info.    MOB had Extended Family present and verbalized agreement with personal interview with them present.    MOB reported the following:    Assessment:  Consult reason: Breast Pump/DME SDOH  Gestational Age at Birth: 41 Weeks + 0 Days  MOB Name (& age if teen):   Veronica Pozo  FOB Name (& age if teen MOB): Giuliano     Other Legal Guardian(s) for Baby:  n/a    Other Children:   First Baby  Housing Plan/Lives with:   MOB FOB  Insurance Coverage/Plan for Baby: MOB verbalizes that they will contact their insurance to add baby ASAP.   Support System: Family and Spouse/Significant Other  Care Items: Car Seat, Crib/Bassinet (Safe Sleep Space), Diapers/Wipes, and Clothing- MOB reported NANDO was buying car seat today.  Method of Feeding: Breast Feeding  Breast Pump: CM ordering/ordered breast pump CM received consult for MOB requesting Zomee for home use. Order placed to [employer] via [place]. Pump delivered to bedside by [employer].   Government Assistance Programs: WIC (Special Supplemental Nutrition Program for Women, Infants, and Children) and CM provided info   Arrangements: MOB and Family  Current Employment/Schooling: MOB employed Full Time  Mental Health History and/or Treatment:   None reported   Substance Use History and/or Treatment: None reported      Urine Drug Screen Results: Not Applicable  Children & Youth History: None  Current Legal Issues: N/A  Domestic/Intimate Partner Violence History: Denies.  NICU Resources: N/A    Discharge Plan:  Pediatrician:   [facility] ([facility])  Prenatal/ Care:[facility]    Follow-Up Appointments Needed/Scheduled: TBD  Medications/DME/Other Referrals: TBD  Transportation Plan: Ride Share/Lyft/Uber    Follow-Up Needed from Care Management:        Zomee delivered by [employer] rep.  CM to provide WIC information.  As per MOB her and FOB can walk to pediatrician as it is a 5 minute walk down the block.  She reported they do not have a car at this time, but are willing to rent a car if needed to get home from the hospital.  CM informed hospital is able to provide uber/lfyt for discharge to home. MOB reported FOB is buying car seat at the store now and will bring it to the hospital.  CM will discuss transportation needs tomorrow when discharge ready.    As per MOB they plan to move out of state soon and will be buying a new car after their move.  CM will continue to follow.

## 2024-12-18 NOTE — LACTATION NOTE
This note was copied from a baby's chart.  CONSULT - LACTATION  Baby Boy Pozo (Dary) 0 days child MRN: [id number]    [facility] NURSERY Room / Bed: (N)/(N) Encounter: [id number]    Maternal Information     MOTHER:  Veronica Pozo  Maternal Age: 39 y.o.  OB History: # 1 - Date: 24, Sex: Male, Weight: 3663 g (8 lb 1.2 oz), GA: 41w0d, Type: Vaginal, Spontaneous, Apgar1: 8, Apgar5: 9, Living: Living, Birth Comments: None   Previouse breast reduction surgery? No    Lactation history:   Has patient previously breast fed: No   How long had patient previously breast fed:     Previous breast feeding complications:     History reviewed. No pertinent surgical history.    Birth information:  YOB: 2025   Time of birth: 12:39 AM   Sex: child   Delivery type: Vaginal, Spontaneous   Birth Weight: 3663 g (8 lb 1.2 oz)   Percent of Weight Change: 0%     Gestational Age: 41w0d   [unfilled]    Assessment     Breast and nipple assessment: normal assessment, hand expression effective for drops of colostrum     Assessment: sleepy    Feeding assessment:  Sleepy, requires stimulation  LATCH:  Latch: Repeated attempts, hold nipple in mouth, stimulate to suck   Audible Swallowing: A few with stimulation   Type of Nipple: Everted (After stimulation)   Comfort (Breast/Nipple): Soft/non-tender   Hold (Positioning): Partial assist, teach one side, mother does other, staff holds   LATCH Score: 7          Feeding recommendations:  breast feed on demandevery 2-3 hours, watching for early feeding cues. At least 8-12 feedings in 24 hours.     24 1400   Lactation Consultation   Reason for Consult 20;20 min   Maternal Information   Has mother  before? No   Exclusive Pump and Bottle Feed No   LATCH Documentation   Latch 1   Audible Swallowing 1   Type of Nipple 2   Comfort (Breast/Nipple) 2   Hold (Positioning) 1   LATCH Score 7   Having latch problems?   (Sleepy,  able to latch)   Position(s) Used Cradle   Breasts/Nipples   Left Breast Soft   Right Breast Soft   Left Nipple Everted   Right Nipple Everted   Intervention Hand expression   Breastfeeding Status Yes   Breastfeeding Progress Not yet established   Patient Follow-Up   Lactation Consult Status 2   Follow-Up Type Inpatient;Call as needed   Other OB Lactation Documentation    Additional Problem Noted Initial: Baby sleepy. Reviewed waking techniques, encouraged skin to skin. Mother has been hand expressing to help baby wake. Placed CM order for BP.  (Reviewed RSB and DC books.)     Patient concern that baby is sleepy. Patient reports to be hand expressing to help wake baby. Baby able to latch at the breast, effective for sucks but is sleepy. Baby was in cradle position, offered to help switch positions and patient states that she feels the most comfortable in cradle. Reiterated proper latch and positioning.     Discussed how to keep baby stimulated at the breast. Reviewed switching breasts and positions to extend baby's feed and keep baby stimulated. Encouraged to continue hand expression.     Feeding plan:  Patient is encouraged to breastfeed on demand: when baby shows hunger cues or least every 2-3 hours. Reviewed that baby should breastfeed at least 8-12 times in 24 hours and watch for early hunger cues. Encouraged patient to offer both breasts during a feed, multiple times.     Discussed how to know when the baby is full at the breast.    Educated patient that baby is more efficient and effective at removing colostrum at the breast. Discussed that colostrum is thick and sticky and comes in small amounts in the first few days. Reviewed that breast milk will start to transition day 3-5.    Discussed how to know the baby is feeding adequately at the breast:  -Baby is deeply latched onto the breast, with lips flanged out. Actively sucking, swallows may not be audible.  -Mother feels a comfortable tugging sensation during  a feeding.  -Baby is showing fullness cues- content, arms relaxed and/or sleeping.  -Baby is having adequate amounts of diapers and meeting goal diapers.  -Baby's stool is changing from black meconium, to greenish brown to yellow and seedy looking over the first week when exclusively providing breast milk.   -Baby's weight loss is within normal ranges.     Reviewed use of feeding log. Patient was encouraged to continue to document baby's feedings and outputs to ensure baby is adequately feeding at the breast. Patient reports that baby has yet to void.    Placed CM order for breast pump.     Patient was encouraged to contact lactation for a latch assessment, continued support and/or questions that arise.     Gloria Quinones 12/18/2024 2:50 PM

## 2024-12-19 VITALS
SYSTOLIC BLOOD PRESSURE: 90 MMHG | TEMPERATURE: 97.9 F | HEART RATE: 89 BPM | OXYGEN SATURATION: 99 % | RESPIRATION RATE: 16 BRPM | DIASTOLIC BLOOD PRESSURE: 54 MMHG | HEIGHT: 64 IN | BODY MASS INDEX: 26.29 KG/M2 | WEIGHT: 154 LBS

## 2024-12-19 PROBLEM — Z34.90 ENCOUNTER FOR INDUCTION OF LABOR: Status: RESOLVED | Noted: 2024-12-16 | Resolved: 2024-12-19

## 2024-12-19 PROCEDURE — NC001 PR NO CHARGE: Performed by: STUDENT IN AN ORGANIZED HEALTH CARE EDUCATION/TRAINING PROGRAM

## 2024-12-19 PROCEDURE — 99024 POSTOP FOLLOW-UP VISIT: CPT | Performed by: STUDENT IN AN ORGANIZED HEALTH CARE EDUCATION/TRAINING PROGRAM

## 2024-12-19 RX ORDER — BENZOCAINE/MENTHOL 6 MG-10 MG
1 LOZENGE MUCOUS MEMBRANE DAILY PRN
Start: 2024-12-19

## 2024-12-19 RX ORDER — ACETAMINOPHEN 500 MG
1000 TABLET ORAL EVERY 6 HOURS PRN
Start: 2024-12-19

## 2024-12-19 RX ORDER — IBUPROFEN 600 MG/1
600 TABLET, FILM COATED ORAL EVERY 6 HOURS PRN
Start: 2024-12-19

## 2024-12-19 RX ORDER — DOCUSATE SODIUM 100 MG/1
100 CAPSULE, LIQUID FILLED ORAL 2 TIMES DAILY PRN
Start: 2024-12-19

## 2024-12-19 RX ADMIN — ACETAMINOPHEN 650 MG: 325 TABLET, FILM COATED ORAL at 05:54

## 2024-12-19 NOTE — NURSING NOTE
Mother in bed sleeping with infant. Mother instructed that she is not to be sleeping with infant in bed with her. Mother stated that the 2 visitors were watching infant.

## 2024-12-19 NOTE — ASSESSMENT & PLAN NOTE
Recovering well   Routine postpartum care, encourage ambulation, encourage familial bonding  Lactation support for breast/bottle feeding as needed  Pre-delivery Hgb 12  FEN: Tolerating regular diet  Pain: Motrin/Tylenol around the clock, oxycodone if needed  Appropriate bowel and bladder function   DVT Ppx: Ambulation   Cephalexin Counseling: I counseled the patient regarding use of cephalexin as an antibiotic for prophylactic and/or therapeutic purposes. Cephalexin (commonly prescribed under brand name Keflex) is a cephalosporin antibiotic which is active against numerous classes of bacteria, including most skin bacteria. Side effects may include nausea, diarrhea, gastrointestinal upset, rash, hives, yeast infections, and in rare cases, hepatitis, kidney disease, seizures, fever, confusion, neurologic symptoms, and others. Patients with severe allergies to penicillin medications are cautioned that there is about a 10% incidence of cross-reactivity with cephalosporins. When possible, patients with penicillin allergies should use alternatives to cephalosporins for antibiotic therapy. Methotrexate Counseling:  Patient counseled regarding adverse effects of methotrexate including but not limited to nausea, vomiting, abnormalities in liver function tests. Patients may develop mouth sores, rash, diarrhea, and abnormalities in blood counts. The patient understands that monitoring is required including LFT's and blood counts.  There is a rare possibility of scarring of the liver and lung problems that can occur when taking methotrexate. Persistent nausea, loss of appetite, pale stools, dark urine, cough, and shortness of breath should be reported immediately. Patient advised to discontinue methotrexate treatment at least three months before attempting to become pregnant.  I discussed the need for folate supplements while taking methotrexate.  These supplements can decrease side effects during methotrexate treatment. The patient verbalized understanding of the proper use and possible adverse effects of methotrexate.  All of the patient's questions and concerns were addressed. Birth Control Pills Counseling: Birth Control Pill Counseling: I discussed with the patient the potential side effects of OCPs including but not limited to increased risk of stroke, heart attack, thrombophlebitis, deep venous thrombosis, hepatic adenomas, breast changes, GI upset, headaches, and depression.  The patient verbalized understanding of the proper use and possible adverse effects of OCPs. All of the patient's questions and concerns were addressed. Isotretinoin Counseling: Patient should get monthly blood tests, not donate blood, not drive at night if vision affected, not share medication, and not undergo elective surgery for 6 months after tx completed. Side effects reviewed, pt to contact office should one occur. Valtrex Pregnancy And Lactation Text: this medication is Pregnancy Category B and is considered safe during pregnancy. This medication is not directly found in breast milk but it's metabolite acyclovir is present. Dapsone Counseling: I discussed with the patient the risks of dapsone including but not limited to hemolytic anemia, agranulocytosis, rashes, methemoglobinemia, kidney failure, peripheral neuropathy, headaches, GI upset, and liver toxicity.  Patients who start dapsone require monitoring including baseline LFTs and weekly CBCs for the first month, then every month thereafter.  The patient verbalized understanding of the proper use and possible adverse effects of dapsone.  All of the patient's questions and concerns were addressed. Humira Pregnancy And Lactation Text: This medication is Pregnancy Category B and is considered safe during pregnancy. It is unknown if this medication is excreted in breast milk. Hydroxychloroquine Pregnancy And Lactation Text: This medication has been shown to cause fetal harm but it isn't assigned a Pregnancy Risk Category. There are small amounts excreted in breast milk. Zyclara Counseling:  I discussed with the patient the risks of imiquimod including but not limited to erythema, scaling, itching, weeping, crusting, and pain.  Patient understands that the inflammatory response to imiquimod is variable from person to person and was educated regarded proper titration schedule.  If flu-like symptoms develop, patient knows to discontinue the medication and contact us. Cimzia Counseling:  I discussed with the patient the risks of Cimzia including but not limited to immunosuppression, allergic reactions and infections.  The patient understands that monitoring is required including a PPD at baseline and must alert us or the primary physician if symptoms of infection or other concerning signs are noted. 5-Fu Pregnancy And Lactation Text: This medication is Pregnancy Category X and contraindicated in pregnancy and in women who may become pregnant. It is unknown if this medication is excreted in breast milk. Hydroxyzine Pregnancy And Lactation Text: This medication is not safe during pregnancy and should not be taken. It is also excreted in breast milk and breast feeding isn't recommended. Tetracycline Pregnancy And Lactation Text: This medication is Pregnancy Category D and not consider safe during pregnancy. It is also excreted in breast milk. Isotretinoin Pregnancy And Lactation Text: This medication is Pregnancy Category X and is considered extremely dangerous during pregnancy. It is unknown if it is excreted in breast milk. Ketoconazole Pregnancy And Lactation Text: This medication is Pregnancy Category C and it isn't know if it is safe during pregnancy. It is also excreted in breast milk and breast feeding isn't recommended. Minocycline Counseling: Patient advised regarding possible photosensitivity and discoloration of the teeth, skin, lips, tongue and gums.  Patient instructed to avoid sunlight, if possible.  When exposed to sunlight, patients should wear protective clothing, sunglasses, and sunscreen.  The patient was instructed to call the office immediately if the following severe adverse effects occur:  hearing changes, easy bruising/bleeding, severe headache, or vision changes.  The patient verbalized understanding of the proper use and possible adverse effects of minocycline.  All of the patient's questions and concerns were addressed. Imiquimod Counseling:  I discussed with the patient the risks of imiquimod including but not limited to erythema, scaling, itching, weeping, crusting, and pain.  Patient understands that the inflammatory response to imiquimod is variable from person to person and was educated regarded proper titration schedule.  If flu-like symptoms develop, patient knows to discontinue the medication and contact us. Cephalexin Pregnancy And Lactation Text: This medication is Pregnancy Category B and considered safe during pregnancy.  It is also excreted in breast milk but can be used safely for shorter doses. Solaraze Pregnancy And Lactation Text: This medication is Pregnancy Category B and is considered safe. There is some data to suggest avoiding during the third trimester. It is unknown if this medication is excreted in breast milk. Nsaids Counseling: NSAID Counseling: I discussed with the patient that NSAIDs should be taken with food. Prolonged use of NSAIDs can result in the development of stomach ulcers.  Patient advised to stop taking NSAIDs if abdominal pain occurs.  The patient verbalized understanding of the proper use and possible adverse effects of NSAIDs.  All of the patient's questions and concerns were addressed. Azathioprine Counseling:  I discussed with the patient the risks of azathioprine including but not limited to myelosuppression, immunosuppression, hepatotoxicity, lymphoma, and infections.  The patient understands that monitoring is required including baseline LFTs, Creatinine, possible TPMP genotyping and weekly CBCs for the first month and then every 2 weeks thereafter.  The patient verbalized understanding of the proper use and possible adverse effects of azathioprine.  All of the patient's questions and concerns were addressed. Ilumya Counseling: I discussed with the patient the risks of tildrakizumab including but not limited to immunosuppression, malignancy, posterior leukoencephalopathy syndrome, and serious infections.  The patient understands that monitoring is required including a PPD at baseline and must alert us or the primary physician if symptoms of infection or other concerning signs are noted. Methotrexate Pregnancy And Lactation Text: This medication is Pregnancy Category X and is known to cause fetal harm. This medication is excreted in breast milk. Birth Control Pills Pregnancy And Lactation Text: This medication should be avoided if pregnant and for the first 30 days post-partum. Zyclara Pregnancy And Lactation Text: This medication is Pregnancy Category C. It is unknown if this medication is excreted in breast milk. Cimzia Pregnancy And Lactation Text: This medication crosses the placenta but can be considered safe in certain situations. Cimzia may be excreted in breast milk. Taltz Counseling: I discussed with the patient the risks of ixekizumab including but not limited to immunosuppression, serious infections, worsening of inflammatory bowel disease and drug reactions.  The patient understands that monitoring is required including a PPD at baseline and must alert us or the primary physician if symptoms of infection or other concerning signs are noted. Dapsone Pregnancy And Lactation Text: This medication is Pregnancy Category C and is not considered safe during pregnancy or breast feeding. Topical Retinoid counseling:  Patient advised to apply a pea-sized amount only at bedtime and wait 30 minutes after washing their face before applying.  If too drying, patient may add a non-comedogenic moisturizer. The patient verbalized understanding of the proper use and possible adverse effects of retinoids.  All of the patient's questions and concerns were addressed. Use Enhanced Medication Counseling?: No Terbinafine Counseling: Patient counseling regarding adverse effects of terbinafine including but not limited to headache, diarrhea, rash, upset stomach, liver function test abnormalities, itching, taste/smell disturbance, nausea, abdominal pain, and flatulence.  There is a rare possibility of liver failure that can occur when taking terbinafine.  The patient understands that a baseline LFT and kidney function test may be required. The patient verbalized understanding of the proper use and possible adverse effects of terbinafine.  All of the patient's questions and concerns were addressed. Nsaids Pregnancy And Lactation Text: These medications are considered safe up to 30 weeks gestation. It is excreted in breast milk. Clindamycin Counseling: I counseled the patient regarding use of clindamycin as an antibiotic for prophylactic and/or therapeutic purposes. Clindamycin is active against numerous classes of bacteria, including skin bacteria. Side effects may include nausea, diarrhea, gastrointestinal upset, rash, hives, yeast infections, and in rare cases, colitis. Drysol Counseling:  I discussed with the patient the risks of drysol/aluminum chloride including but not limited to skin rash, itching, irritation, burning. Spironolactone Counseling: Patient advised regarding risks of diarrhea, abdominal pain, hyperkalemia, birth defects (for female patients), liver toxicity and renal toxicity. The patient may need blood work to monitor liver and kidney function and potassium levels while on therapy. The patient verbalized understanding of the proper use and possible adverse effects of spironolactone.  All of the patient's questions and concerns were addressed. Erivedge Counseling- I discussed with the patient the risks of Erivedge including but not limited to nausea, vomiting, diarrhea, constipation, weight loss, changes in the sense of taste, decreased appetite, muscle spasms, and hair loss.  The patient verbalized understanding of the proper use and possible adverse effects of Erivedge.  All of the patient's questions and concerns were addressed. Taltz Pregnancy And Lactation Text: The risk during pregnancy and breastfeeding is uncertain with this medication. Detail Level: Detailed Azathioprine Pregnancy And Lactation Text: This medication is Pregnancy Category D and isn't considered safe during pregnancy. It is unknown if this medication is excreted in breast milk. Cosentyx Counseling:  I discussed with the patient the risks of Cosentyx including but not limited to worsening of Crohn's disease, immunosuppression, allergic reactions and infections.  The patient understands that monitoring is required including a PPD at baseline and must alert us or the primary physician if symptoms of infection or other concerning signs are noted. High Dose Vitamin A Counseling: Side effects reviewed, pt to contact office should one occur. Albendazole Counseling:  I discussed with the patient the risks of albendazole including but not limited to cytopenia, kidney damage, nausea/vomiting and severe allergy.  The patient understands that this medication is being used in an off-label manner. Prednisone Counseling:  I discussed with the patient the risks of prolonged use of prednisone including but not limited to weight gain, insomnia, osteoporosis, mood changes, diabetes, susceptibility to infection, glaucoma and high blood pressure.  In cases where prednisone use is prolonged, patients should be monitored with blood pressure checks, serum glucose levels and an eye exam.  Additionally, the patient may need to be placed on GI prophylaxis, PCP prophylaxis, and calcium and vitamin D supplementation and/or a bisphosphonate.  The patient verbalized understanding of the proper use and the possible adverse effects of prednisone.  All of the patient's questions and concerns were addressed. Terbinafine Pregnancy And Lactation Text: This medication is Pregnancy Category B and is considered safe during pregnancy. It is also excreted in breast milk and breast feeding isn't recommended. Spironolactone Pregnancy And Lactation Text: This medication can cause feminization of the male fetus and should be avoided during pregnancy. The active metabolite is also found in breast milk. Minoxidil Counseling: Minoxidil is a topical medication which can increase blood flow where it is applied. It is uncertain how this medication increases hair growth. Side effects are uncommon and include stinging and allergic reactions. Fluconazole Counseling:  Patient counseled regarding adverse effects of fluconazole including but not limited to headache, diarrhea, nausea, upset stomach, liver function test abnormalities, taste disturbance, and stomach pain.  There is a rare possibility of liver failure that can occur when taking fluconazole.  The patient understands that monitoring of LFTs and kidney function test may be required, especially at baseline. The patient verbalized understanding of the proper use and possible adverse effects of fluconazole.  All of the patient's questions and concerns were addressed. Quinolones Counseling:  I discussed with the patient the risks of fluoroquinolones including but not limited to GI upset, allergic reaction, drug rash, diarrhea, dizziness, photosensitivity, yeast infections, liver function test abnormalities, tendonitis/tendon rupture. High Dose Vitamin A Pregnancy And Lactation Text: High dose vitamin A therapy is contraindicated during pregnancy and breast feeding. Erivedge Pregnancy And Lactation Text: This medication is Pregnancy Category X and is absolutely contraindicated during pregnancy. It is unknown if it is excreted in breast milk. Drysol Pregnancy And Lactation Text: This medication is considered safe during pregnancy and breast feeding. Odomzo Counseling- I discussed with the patient the risks of Odomzo including but not limited to nausea, vomiting, diarrhea, constipation, weight loss, changes in the sense of taste, decreased appetite, muscle spasms, and hair loss.  The patient verbalized understanding of the proper use and possible adverse effects of Odomzo.  All of the patient's questions and concerns were addressed. Clindamycin Pregnancy And Lactation Text: This medication can be used in pregnancy if certain situations. Clindamycin is also present in breast milk. Infliximab Counseling:  I discussed with the patient the risks of infliximab including but not limited to myelosuppression, immunosuppression, autoimmune hepatitis, demyelinating diseases, lymphoma, and serious infections.  The patient understands that monitoring is required including a PPD at baseline and must alert us or the primary physician if symptoms of infection or other concerning signs are noted. Arava Counseling:  Patient counseled regarding adverse effects of Arava including but not limited to nausea, vomiting, abnormalities in liver function tests. Patients may develop mouth sores, rash, diarrhea, and abnormalities in blood counts. The patient understands that monitoring is required including LFTs and blood counts.  There is a rare possibility of scarring of the liver and lung problems that can occur when taking methotrexate. Persistent nausea, loss of appetite, pale stools, dark urine, cough, and shortness of breath should be reported immediately. Patient advised to discontinue Arava treatment and consult with a physician prior to attempting conception. The patient will have to undergo a treatment to eliminate Arava from the body prior to conception. Cellcept Counseling:  I discussed with the patient the risks of mycophenolate mofetil including but not limited to infection/immunosuppression, GI upset, hypokalemia, hypercholesterolemia, bone marrow suppression, lymphoproliferative disorders, malignancy, GI ulceration/bleed/perforation, colitis, interstitial lung disease, kidney failure, progressive multifocal leukoencephalopathy, and birth defects.  The patient understands that monitoring is required including a baseline creatinine and regular CBC testing. In addition, patient must alert us immediately if symptoms of infection or other concerning signs are noted. Albendazole Pregnancy And Lactation Text: This medication is Pregnancy Category C and it isn't known if it is safe during pregnancy. It is also excreted in breast milk. Prednisone Pregnancy And Lactation Text: This medication is Pregnancy Category C and it isn't know if it is safe during pregnancy. This medication is excreted in breast milk. Tremfya Counseling: I discussed with the patient the risks of guselkumab including but not limited to immunosuppression, serious infections, worsening of inflammatory bowel disease and drug reactions.  The patient understands that monitoring is required including a PPD at baseline and must alert us or the primary physician if symptoms of infection or other concerning signs are noted. SSKI Counseling:  I discussed with the patient the risks of SSKI including but not limited to thyroid abnormalities, metallic taste, GI upset, fever, headache, acne, arthralgias, paraesthesias, lymphadenopathy, easy bleeding, arrhythmias, and allergic reaction. Minoxidil Pregnancy And Lactation Text: This medication has not been assigned a Pregnancy Risk Category but animal studies failed to show danger with the topical medication. It is unknown if the medication is excreted in breast milk. Tazorac Counseling:  Patient advised that medication is irritating and drying.  Patient may need to apply sparingly and wash off after an hour before eventually leaving it on overnight.  The patient verbalized understanding of the proper use and possible adverse effects of tazorac.  All of the patient's questions and concerns were addressed. Fluconazole Pregnancy And Lactation Text: This medication is Pregnancy Category C and it isn't know if it is safe during pregnancy. It is also excreted in breast milk. Elidel Counseling: Patient may experience a mild burning sensation during topical application. Elidel is not approved in children less than 2 years of age. There have been case reports of hematologic and skin malignancies in patients using topical calcineurin inhibitors although causality is questionable. Doxycycline Counseling:  Patient counseled regarding possible photosensitivity and increased risk for sunburn.  Patient instructed to avoid sunlight, if possible.  When exposed to sunlight, patients should wear protective clothing, sunglasses, and sunscreen.  The patient was instructed to call the office immediately if the following severe adverse effects occur:  hearing changes, easy bruising/bleeding, severe headache, or vision changes.  The patient verbalized understanding of the proper use and possible adverse effects of doxycycline.  All of the patient's questions and concerns were addressed. Dupixent Counseling: I discussed with the patient the risks of dupilumab including but not limited to eye infection and irritation, cold sores, injection site reactions, worsening of asthma, allergic reactions and increased risk of parasitic infection.  Live vaccines should be avoided while taking dupilumab. Dupilumab will also interact with certain medications such as warfarin and cyclosporine. The patient understands that monitoring is required and they must alert us or the primary physician if symptoms of infection or other concerning signs are noted. Gabapentin Counseling: I discussed with the patient the risks of gabapentin including but not limited to dizziness, somnolence, fatigue and ataxia. Ivermectin Counseling:  Patient instructed to take medication on an empty stomach with a full glass of water.  Patient informed of potential adverse effects including but not limited to nausea, diarrhea, dizziness, itching, and swelling of the extremities or lymph nodes.  The patient verbalized understanding of the proper use and possible adverse effects of ivermectin.  All of the patient's questions and concerns were addressed. Tazorac Pregnancy And Lactation Text: This medication is not safe during pregnancy. It is unknown if this medication is excreted in breast milk. Cimetidine Counseling:  I discussed with the patient the risks of Cimetidine including but not limited to gynecomastia, headache, diarrhea, nausea, drowsiness, arrhythmias, pancreatitis, skin rashes, psychosis, bone marrow suppression and kidney toxicity. Griseofulvin Counseling:  I discussed with the patient the risks of griseofulvin including but not limited to photosensitivity, cytopenia, liver damage, nausea/vomiting and severe allergy.  The patient understands that this medication is best absorbed when taken with a fatty meal (e.g., ice cream or french fries). Otezla Counseling: The side effects of Otezla were discussed with the patient, including but not limited to worsening or new depression, weight loss, diarrhea, nausea, upper respiratory tract infection, and headache. Patient instructed to call the office should any adverse effect occur.  The patient verbalized understanding of the proper use and possible adverse effects of Otezla.  All the patient's questions and concerns were addressed. Sski Pregnancy And Lactation Text: This medication is Pregnancy Category D and isn't considered safe during pregnancy. It is excreted in breast milk. Rifampin Counseling: I discussed with the patient the risks of rifampin including but not limited to liver damage, kidney damage, red-orange body fluids, nausea/vomiting and severe allergy. Picato Counseling:  I discussed with the patient the risks of Picato including but not limited to erythema, scaling, itching, weeping, crusting, and pain. Simponi Counseling:  I discussed with the patient the risks of golimumab including but not limited to myelosuppression, immunosuppression, autoimmune hepatitis, demyelinating diseases, lymphoma, and serious infections.  The patient understands that monitoring is required including a PPD at baseline and must alert us or the primary physician if symptoms of infection or other concerning signs are noted. Gabapentin Pregnancy And Lactation Text: This medication is Pregnancy Category C and isn't considered safe during pregnancy. It is excreted in breast milk. Rituxan Counseling:  I discussed with the patient the risks of Rituxan infusions. Side effects can include infusion reactions, severe drug rashes including mucocutaneous reactions, reactivation of latent hepatitis and other infections and rarely progressive multifocal leukoencephalopathy.  All of the patient's questions and concerns were addressed. Acitretin Counseling:  I discussed with the patient the risks of acitretin including but not limited to hair loss, dry lips/skin/eyes, liver damage, hyperlipidemia, depression/suicidal ideation, photosensitivity.  Serious rare side effects can include but are not limited to pancreatitis, pseudotumor cerebri, bony changes, clot formation/stroke/heart attack.  Patient understands that alcohol is contraindicated since it can result in liver toxicity and significantly prolong the elimination of the drug by many years. Dupixent Pregnancy And Lactation Text: This medication likely crosses the placenta but the risk for the fetus is uncertain. This medication is excreted in breast milk. Benzoyl Peroxide Counseling: Patient counseled that medicine may cause skin irritation and bleach clothing.  In the event of skin irritation, the patient was advised to reduce the amount of the drug applied or use it less frequently.   The patient verbalized understanding of the proper use and possible adverse effects of benzoyl peroxide.  All of the patient's questions and concerns were addressed. Doxycycline Pregnancy And Lactation Text: This medication is Pregnancy Category D and not consider safe during pregnancy. It is also excreted in breast milk but is considered safe for shorter treatment courses. Cyclophosphamide Counseling:  I discussed with the patient the risks of cyclophosphamide including but not limited to hair loss, hormonal abnormalities, decreased fertility, abdominal pain, diarrhea, nausea and vomiting, bone marrow suppression and infection. The patient understands that monitoring is required while taking this medication. Topical Clindamycin Counseling: Patient counseled that this medication may cause skin irritation or allergic reactions.  In the event of skin irritation, the patient was advised to reduce the amount of the drug applied or use it less frequently.   The patient verbalized understanding of the proper use and possible adverse effects of clindamycin.  All of the patient's questions and concerns were addressed. Azithromycin Counseling:  I discussed with the patient the risks of azithromycin including but not limited to GI upset, allergic reaction, drug rash, diarrhea, and yeast infections. Clofazimine Counseling:  I discussed with the patient the risks of clofazimine including but not limited to skin and eye pigmentation, liver damage, nausea/vomiting, gastrointestinal bleeding and allergy. Thalidomide Counseling: I discussed with the patient the risks of thalidomide including but not limited to birth defects, anxiety, weakness, chest pain, dizziness, cough and severe allergy. Xeljanz Counseling: I discussed with the patient the risks of Xeljanz therapy including increased risk of infection, liver issues, headache, diarrhea, or cold symptoms. Live vaccines should be avoided. They were instructed to call if they have any problems. Griseofulvin Pregnancy And Lactation Text: This medication is Pregnancy Category X and is known to cause serious birth defects. It is unknown if this medication is excreted in breast milk but breast feeding should be avoided. Rituxan Pregnancy And Lactation Text: This medication is Pregnancy Category C and it isn't know if it is safe during pregnancy. It is unknown if this medication is excreted in breast milk but similar antibodies are known to be excreted. Rifampin Pregnancy And Lactation Text: This medication is Pregnancy Category C and it isn't know if it is safe during pregnancy. It is also excreted in breast milk and should not be used if you are breast feeding. Benzoyl Peroxide Pregnancy And Lactation Text: This medication is Pregnancy Category C. It is unknown if benzoyl peroxide is excreted in breast milk. Glycopyrrolate Counseling:  I discussed with the patient the risks of glycopyrrolate including but not limited to skin rash, drowsiness, dry mouth, difficulty urinating, and blurred vision. Eucrisa Counseling: Patient may experience a mild burning sensation during topical application. Eucrisa is not approved in children less than 2 years of age. Erythromycin Counseling:  I discussed with the patient the risks of erythromycin including but not limited to GI upset, allergic reaction, drug rash, diarrhea, increase in liver enzymes, and yeast infections. Otezla Pregnancy And Lactation Text: This medication is Pregnancy Category C and it isn't known if it is safe during pregnancy. It is unknown if it is excreted in breast milk. Cyclophosphamide Pregnancy And Lactation Text: This medication is Pregnancy Category D and it isn't considered safe during pregnancy. This medication is excreted in breast milk. Enbrel Counseling:  I discussed with the patient the risks of etanercept including but not limited to myelosuppression, immunosuppression, autoimmune hepatitis, demyelinating diseases, lymphoma, and infections.  The patient understands that monitoring is required including a PPD at baseline and must alert us or the primary physician if symptoms of infection or other concerning signs are noted. Azithromycin Pregnancy And Lactation Text: This medication is considered safe during pregnancy and is also secreted in breast milk. Acitretin Pregnancy And Lactation Text: This medication is Pregnancy Category X and should not be given to women who are pregnant or may become pregnant in the future. This medication is excreted in breast milk. Xeljuan antonioz Pregnancy And Lactation Text: This medication is Pregnancy Category D and is not considered safe during pregnancy.  The risk during breast feeding is also uncertain. Protopic Counseling: Patient may experience a mild burning sensation during topical application. Protopic is not approved in children less than 2 years of age. There have been case reports of hematologic and skin malignancies in patients using topical calcineurin inhibitors although causality is questionable. Doxepin Counseling:  Patient advised that the medication is sedating and not to drive a car after taking this medication. Patient informed of potential adverse effects including but not limited to dry mouth, urinary retention, and blurry vision.  The patient verbalized understanding of the proper use and possible adverse effects of doxepin.  All of the patient's questions and concerns were addressed. Itraconazole Counseling:  I discussed with the patient the risks of itraconazole including but not limited to liver damage, nausea/vomiting, neuropathy, and severe allergy.  The patient understands that this medication is best absorbed when taken with acidic beverages such as non-diet cola or ginger ale.  The patient understands that monitoring is required including baseline LFTs and repeat LFTs at intervals.  The patient understands that they are to contact us or the primary physician if concerning signs are noted. Topical Clindamycin Pregnancy And Lactation Text: This medication is Pregnancy Category B and is considered safe during pregnancy. It is unknown if it is excreted in breast milk. Oxybutynin Counseling:  I discussed with the patient the risks of oxybutynin including but not limited to skin rash, drowsiness, dry mouth, difficulty urinating, and blurred vision. Skyrizi Counseling: I discussed with the patient the risks of risankizumab-rzaa including but not limited to immunosuppression, and serious infections.  The patient understands that monitoring is required including a PPD at baseline and must alert us or the primary physician if symptoms of infection or other concerning signs are noted. Carac Counseling:  I discussed with the patient the risks of Carac including but not limited to erythema, scaling, itching, weeping, crusting, and pain. Siliq Counseling:  I discussed with the patient the risks of Siliq including but not limited to new or worsening depression, suicidal thoughts and behavior, immunosuppression, malignancy, posterior leukoencephalopathy syndrome, and serious infections.  The patient understands that monitoring is required including a PPD at baseline and must alert us or the primary physician if symptoms of infection or other concerning signs are noted. There is also a special program designed to monitor depression which is required with Siliq. Sarecycline Counseling: Patient advised regarding possible photosensitivity and discoloration of the teeth, skin, lips, tongue and gums.  Patient instructed to avoid sunlight, if possible.  When exposed to sunlight, patients should wear protective clothing, sunglasses, and sunscreen.  The patient was instructed to call the office immediately if the following severe adverse effects occur:  hearing changes, easy bruising/bleeding, severe headache, or vision changes.  The patient verbalized understanding of the proper use and possible adverse effects of sarecycline.  All of the patient's questions and concerns were addressed. Bexarotene Counseling:  I discussed with the patient the risks of bexarotene including but not limited to hair loss, dry lips/skin/eyes, liver abnormalities, hyperlipidemia, pancreatitis, depression/suicidal ideation, photosensitivity, drug rash/allergic reactions, hypothyroidism, anemia, leukopenia, infection, cataracts, and teratogenicity.  Patient understands that they will need regular blood tests to check lipid profile, liver function tests, white blood cell count, thyroid function tests and pregnancy test if applicable. Erythromycin Pregnancy And Lactation Text: This medication is Pregnancy Category B and is considered safe during pregnancy. It is also excreted in breast milk. Colchicine Counseling:  Patient counseled regarding adverse effects including but not limited to stomach upset (nausea, vomiting, stomach pain, or diarrhea).  Patient instructed to limit alcohol consumption while taking this medication.  Colchicine may reduce blood counts especially with prolonged use.  The patient understands that monitoring of kidney function and blood counts may be required, especially at baseline. The patient verbalized understanding of the proper use and possible adverse effects of colchicine.  All of the patient's questions and concerns were addressed. Bactrim Counseling:  I discussed with the patient the risks of sulfa antibiotics including but not limited to GI upset, allergic reaction, drug rash, diarrhea, dizziness, photosensitivity, and yeast infections.  Rarely, more serious reactions can occur including but not limited to aplastic anemia, agranulocytosis, methemoglobinemia, blood dyscrasias, liver or kidney failure, lung infiltrates or desquamative/blistering drug rashes. Glycopyrrolate Pregnancy And Lactation Text: This medication is Pregnancy Category B and is considered safe during pregnancy. It is unknown if it is excreted breast milk. Xolair Counseling:  Patient informed of potential adverse effects including but not limited to fever, muscle aches, rash and allergic reactions.  The patient verbalized understanding of the proper use and possible adverse effects of Xolair.  All of the patient's questions and concerns were addressed. Cyclosporine Counseling:  I discussed with the patient the risks of cyclosporine including but not limited to hypertension, gingival hyperplasia,myelosuppression, immunosuppression, liver damage, kidney damage, neurotoxicity, lymphoma, and serious infections. The patient understands that monitoring is required including baseline blood pressure, CBC, CMP, lipid panel and uric acid, and then 1-2 times monthly CMP and blood pressure. Doxepin Pregnancy And Lactation Text: This medication is Pregnancy Category C and it isn't known if it is safe during pregnancy. It is also excreted in breast milk and breast feeding isn't recommended. Topical Sulfur Applications Counseling: Topical Sulfur Counseling: Patient counseled that this medication may cause skin irritation or allergic reactions.  In the event of skin irritation, the patient was advised to reduce the amount of the drug applied or use it less frequently.   The patient verbalized understanding of the proper use and possible adverse effects of topical sulfur application.  All of the patient's questions and concerns were addressed. Hydroquinone Counseling:  Patient advised that medication may result in skin irritation, lightening (hypopigmentation), dryness, and burning.  In the event of skin irritation, the patient was advised to reduce the amount of the drug applied or use it less frequently.  Rarely, spots that are treated with hydroquinone can become darker (pseudoochronosis).  Should this occur, patient instructed to stop medication and call the office. The patient verbalized understanding of the proper use and possible adverse effects of hydroquinone.  All of the patient's questions and concerns were addressed. Protopic Pregnancy And Lactation Text: This medication is Pregnancy Category C. It is unknown if this medication is excreted in breast milk when applied topically. Valtrex Counseling: I discussed with the patient the risks of valacyclovir including but not limited to kidney damage, nausea, vomiting and severe allergy.  The patient understands that if the infection seems to be worsening or is not improving, they are to call. Bexarotene Pregnancy And Lactation Text: This medication is Pregnancy Category X and should not be given to women who are pregnant or may become pregnant. This medication should not be used if you are breast feeding. Metronidazole Counseling:  I discussed with the patient the risks of metronidazole including but not limited to seizures, nausea/vomiting, a metallic taste in the mouth, nausea/vomiting and severe allergy. Humira Counseling:  I discussed with the patient the risks of adalimumab including but not limited to myelosuppression, immunosuppression, autoimmune hepatitis, demyelinating diseases, lymphoma, and serious infections.  The patient understands that monitoring is required including a PPD at baseline and must alert us or the primary physician if symptoms of infection or other concerning signs are noted. Bactrim Pregnancy And Lactation Text: This medication is Pregnancy Category D and is known to cause fetal risk.  It is also excreted in breast milk. Hydroxyzine Counseling: Patient advised that the medication is sedating and not to drive a car after taking this medication.  Patient informed of potential adverse effects including but not limited to dry mouth, urinary retention, and blurry vision.  The patient verbalized understanding of the proper use and possible adverse effects of hydroxyzine.  All of the patient's questions and concerns were addressed. Xolair Pregnancy And Lactation Text: This medication is Pregnancy Category B and is considered safe during pregnancy. This medication is excreted in breast milk. Stelara Counseling:  I discussed with the patient the risks of ustekinumab including but not limited to immunosuppression, malignancy, posterior leukoencephalopathy syndrome, and serious infections.  The patient understands that monitoring is required including a PPD at baseline and must alert us or the primary physician if symptoms of infection or other concerning signs are noted. Hydroxychloroquine Counseling:  I discussed with the patient that a baseline ophthalmologic exam is needed at the start of therapy and every year thereafter while on therapy. A CBC may also be warranted for monitoring.  The side effects of this medication were discussed with the patient, including but not limited to agranulocytosis, aplastic anemia, seizures, rashes, retinopathy, and liver toxicity. Patient instructed to call the office should any adverse effect occur.  The patient verbalized understanding of the proper use and possible adverse effects of Plaquenil.  All the patient's questions and concerns were addressed. Ketoconazole Counseling:   Patient counseled regarding improving absorption with orange juice.  Adverse effects include but are not limited to breast enlargement, headache, diarrhea, nausea, upset stomach, liver function test abnormalities, taste disturbance, and stomach pain.  There is a rare possibility of liver failure that can occur when taking ketoconazole. The patient understands that monitoring of LFTs may be required, especially at baseline. The patient verbalized understanding of the proper use and possible adverse effects of ketoconazole.  All of the patient's questions and concerns were addressed. Solaraze Counseling:  I discussed with the patient the risks of Solaraze including but not limited to erythema, scaling, itching, weeping, crusting, and pain. 5-Fu Counseling: 5-Fluorouracil Counseling:  I discussed with the patient the risks of 5-fluorouracil including but not limited to erythema, scaling, itching, weeping, crusting, and pain. Tetracycline Counseling: Patient counseled regarding possible photosensitivity and increased risk for sunburn.  Patient instructed to avoid sunlight, if possible.  When exposed to sunlight, patients should wear protective clothing, sunglasses, and sunscreen.  The patient was instructed to call the office immediately if the following severe adverse effects occur:  hearing changes, easy bruising/bleeding, severe headache, or vision changes.  The patient verbalized understanding of the proper use and possible adverse effects of tetracycline.  All of the patient's questions and concerns were addressed. Patient understands to avoid pregnancy while on therapy due to potential birth defects. Metronidazole Pregnancy And Lactation Text: This medication is Pregnancy Category B and considered safe during pregnancy.  It is also excreted in breast milk. Topical Sulfur Applications Pregnancy And Lactation Text: This medication is Pregnancy Category C and has an unknown safety profile during pregnancy. It is unknown if this topical medication is excreted in breast milk.

## 2024-12-19 NOTE — LACTATION NOTE
This note was copied from a baby's chart.     12/19/24 4337   Lactation Consultation   Reason for Consult 20 m;20 min   Lactation Consultant Total Time 40   Maternal Information   Has mother  before? No   Exclusive Pump and Bottle Feed No   LATCH Documentation   Latch 2   Audible Swallowing 2   Type of Nipple 2   Comfort (Breast/Nipple) 2   Hold (Positioning) 2   LATCH Score 10   Position(s) Used Cradle   Breasts/Nipples   Left Breast Soft   Right Breast Soft   Left Nipple Everted   Right Nipple Everted   Intervention Hand expression   Breastfeeding Status Yes   Breastfeeding Progress Not yet established   Breast Pump   Pump 3   Patient Follow-Up   Lactation Consult Status 2   Follow-Up Type Inpatient;Call as needed   Other OB Lactation Documentation    Additional Problem Noted F/up: Mom nursing baby upon arrival into room in cradle hold on left breast. Mom reports no pain or discomfort. Mom reports baby is more alert and feeding better. Reviewed cluster feedings with mom. Enc to call for further assistance.       F/up: Mom nursing baby upon arrival into room in cradle hold on left breast. Mom reports no pain or discomfort. Mom reports baby is more alert and feeding better. Reviewed cluster feedings with mom. Enc to call for further assistance.    Education on s2s for feedings. Encouraged hand expression prior to latch. Education on baby's body alignment; belly to belly with mom; ear, shoulder hip alignment, long neck, chin / cheek touching cheek. Reviewed how baby can breathe at the breast. Tugging sensation, no pinching/pain.    Discussed 2nd night syndrome and ways to calm infant. Hand out given. Information on hand expression given. Discussed benefits of knowing how to manually express breast including stimulating milk supply, softening nipple for latch and evacuating breast in the event of engorgement.    Encouraged parents to call for assistance, questions, and concerns about breastfeeding.  Extension  provided.

## 2024-12-19 NOTE — PROGRESS NOTES
"Progress Note - OB/GYN  Veronica Pozo 39 y.o. female MRN: [id number]  Unit/Bed#: -01 Encounter: [id number]    Assessment and Plan     Veronica Pozo is a patient of: [facility] . She is PPD # 1 s/p  spontaneous vaginal delivery. Recovering well and is stable.       *  (spontaneous vaginal delivery)  Assessment & Plan  Recovering well   Routine postpartum care, encourage ambulation, encourage familial bonding  Lactation support for breast/bottle feeding as needed  Pre-delivery Hgb 12  FEN: Tolerating regular diet  Pain: Motrin/Tylenol around the clock, oxycodone if needed  Appropriate bowel and bladder function   DVT Ppx: Ambulation    Encounter for induction of labor-resolved as of 2024  Assessment & Plan            Disposition   Anticipate discharge home on PPD # 1.      Subjective/Objective     Chief Complaint: Postpartum State     Subjective:    Veronica Pozo is PPD/POD#1 s/p  spontaneous vaginal delivery. She has no acute complaints. Pain is well controlled on Tylenol. Patient is currently voiding. She is ambulating. Patient is currently passing flatus and has had bowel movement. She is tolerating PO, and denies nausea or vomitting. Patient denies fever, chills, chest pain, shortness of breath, or calf tenderness. Lochia is normal. She is Breastfeeding. She is recovering well and is stable. Not interested in Hep B vaccination until she can confirm with her past medical records.       Vitals:   BP 98/67 (BP Location: Left arm)   Pulse 71   Temp 97.8 °F (36.6 °C) (Oral)   Resp 18   Ht 5' 4\" (1.626 m)   Wt 69.9 kg (154 lb)   LMP 2024 (Exact Date)   SpO2 98%   Breastfeeding Yes   BMI 26.43 kg/m²       Intake/Output Summary (Last 24 hours) at 2024 0648  Last data filed at 2024 0930  Gross per 24 hour   Intake --   Output 400 ml   Net -400 ml       Invasive Devices       None                   Physical Exam:   GEN: Veronica Pozo appears well, alert and oriented x3, pleasant and " cooperative   CARDIO: RRR, no murmurs or rubs  RESP:  CTAB, no wheezes or rales  ABDOMEN: soft, no tenderness, no distention, fundus @ 1 cm  EXTREMITIES: SCDs on, non tender, no erythema, trace edema in left lower leg      Labs:     Hemoglobin   Date Value Ref Range Status   12/16/2024 12.0 11.5 - 15.4 g/dL Final   10/06/2024 11.2 (L) 11.5 - 15.4 g/dL Final     WBC   Date Value Ref Range Status   12/16/2024 9.56 4.31 - 10.16 Thousand/uL Final   10/06/2024 10.03 4.31 - 10.16 Thousand/uL Final     Platelets   Date Value Ref Range Status   12/16/2024 247 149 - 390 Thousands/uL Final   10/06/2024 221 149 - 390 Thousands/uL Final          Rachel Iraheta MD  12/19/2024  6:48 AM

## 2024-12-19 NOTE — PLAN OF CARE
Problem: PAIN - ADULT  Goal: Verbalizes/displays adequate comfort level or baseline comfort level  Description: Interventions:  - Encourage patient to monitor pain and request assistance  - Assess pain using appropriate pain scale  - Administer analgesics based on type and severity of pain and evaluate response  - Implement non-pharmacological measures as appropriate and evaluate response  - Consider cultural and social influences on pain and pain management  - Notify physician/advanced practitioner if interventions unsuccessful or patient reports new pain  Outcome: Completed     Problem: INFECTION - ADULT  Goal: Absence or prevention of progression during hospitalization  Description: INTERVENTIONS:  - Assess and monitor for signs and symptoms of infection  - Monitor lab/diagnostic results  - Monitor all insertion sites, i.e. indwelling lines, tubes, and drains  - Monitor endotracheal if appropriate and nasal secretions for changes in amount and color  - [place] appropriate cooling/warming therapies per order  - Administer medications as ordered  - Instruct and encourage patient and family to use good hand hygiene technique  - Identify and instruct in appropriate isolation precautions for identified infection/condition  Outcome: Completed  Goal: Absence of fever/infection during neutropenic period  Description: INTERVENTIONS:  - Monitor WBC    Outcome: Completed     Problem: SAFETY ADULT  Goal: Patient will remain free of falls  Description: INTERVENTIONS:  - Educate patient/family on patient safety including physical limitations  - Instruct patient to call for assistance with activity   - Consult OT/PT to assist with strengthening/mobility   - Keep Call bell within reach  - Keep bed low and locked with side rails adjusted as appropriate  - Keep care items and personal belongings within reach  - Initiate and maintain comfort rounds  - Make Fall Risk Sign visible to staff  - Offer Toileting in advance of need  -  Initiate/Maintain alarm  - Obtain necessary fall risk management equipment:   - Apply yellow socks and bracelet for high fall risk patients  - Consider moving patient to room near nurses station  Outcome: Completed  Goal: Maintain or return to baseline ADL function  Description: INTERVENTIONS:  -  Assess patient's ability to carry out ADLs; assess patient's baseline for ADL function and identify physical deficits which impact ability to perform ADLs (bathing, care of mouth/teeth, toileting, grooming, dressing, etc.)  - Assess/evaluate cause of self-care deficits   - Assess range of motion  - Assess patient's mobility; develop plan if impaired  - Assess patient's need for assistive devices and provide as appropriate  - Encourage maximum independence but intervene and supervise when necessary  - Involve family in performance of ADLs  - Assess for home care needs following discharge   - Consider OT consult to assist with ADL evaluation and planning for discharge  - Provide patient education as appropriate  Outcome: Completed  Goal: Maintains/Returns to pre admission functional level  Description: INTERVENTIONS:  - Perform AM-PAC 6 Click Basic Mobility/ Daily Activity assessment daily.  - Set and communicate daily mobility goal to care team and patient/family/caregiver.   - Collaborate with rehabilitation services on mobility goals if consulted    - Out of bed for toileting  - Record patient progress and toleration of activity level   Outcome: Completed     Problem: DISCHARGE PLANNING  Goal: Discharge to home or other facility with appropriate resources  Description: INTERVENTIONS:  - Identify barriers to discharge w/patient and caregiver  - Arrange for needed discharge resources and transportation as appropriate  - Identify discharge learning needs (meds, wound care, etc.)  - Arrange for interpretive services to assist at discharge as needed  - Refer to Case Management Department for coordinating discharge planning if  the patient needs post-hospital services based on physician/advanced practitioner order or complex needs related to functional status, cognitive ability, or social support system  Outcome: Completed     Problem: POSTPARTUM  Goal: Experiences normal postpartum course  Description: INTERVENTIONS:  - Monitor maternal vital signs  - Assess uterine involution and lochia  Outcome: Completed  Goal: Appropriate maternal -  bonding  Description: INTERVENTIONS:  - Identify family support  - Assess for appropriate maternal/infant bonding   -Encourage maternal/infant bonding opportunities  - Referral to  or  as needed  Outcome: Completed  Goal: Establishment of infant feeding pattern  Description: INTERVENTIONS:  - Assess breast/bottle feeding  - Refer to lactation as needed  Outcome: Completed  Goal: Incision(s), wounds(s) or drain site(s) healing without S/S of infection  Description: INTERVENTIONS  - Assess and document dressing, incision, wound bed, drain sites and surrounding tissue  - Provide patient and family education  - Perform skin care/dressing changes   Outcome: Completed

## 2024-12-19 NOTE — PLAN OF CARE
Problem: PAIN - ADULT  Goal: Verbalizes/displays adequate comfort level or baseline comfort level  Description: Interventions:  - Encourage patient to monitor pain and request assistance  - Assess pain using appropriate pain scale  - Administer analgesics based on type and severity of pain and evaluate response  - Implement non-pharmacological measures as appropriate and evaluate response  - Consider cultural and social influences on pain and pain management  - Notify physician/advanced practitioner if interventions unsuccessful or patient reports new pain  Outcome: Progressing     Problem: INFECTION - ADULT  Goal: Absence or prevention of progression during hospitalization  Description: INTERVENTIONS:  - Assess and monitor for signs and symptoms of infection  - Monitor lab/diagnostic results  - Monitor all insertion sites, i.e. indwelling lines, tubes, and drains  - Monitor endotracheal if appropriate and nasal secretions for changes in amount and color  - [place] appropriate cooling/warming therapies per order  - Administer medications as ordered  - Instruct and encourage patient and family to use good hand hygiene technique  - Identify and instruct in appropriate isolation precautions for identified infection/condition  Outcome: Progressing  Goal: Absence of fever/infection during neutropenic period  Description: INTERVENTIONS:  - Monitor WBC    Outcome: Progressing     Problem: SAFETY ADULT  Goal: Patient will remain free of falls  Description: INTERVENTIONS:  - Educate patient/family on patient safety including physical limitations  - Instruct patient to call for assistance with activity   - Consult OT/PT to assist with strengthening/mobility   - Keep Call bell within reach  - Keep bed low and locked with side rails adjusted as appropriate  - Keep care items and personal belongings within reach  - Initiate and maintain comfort rounds  - Make Fall Risk Sign visible to staff  - Apply yellow socks and bracelet  for high fall risk patients  - Consider moving patient to room near nurses station  Outcome: Progressing  Goal: Maintain or return to baseline ADL function  Description: INTERVENTIONS:  -  Assess patient's ability to carry out ADLs; assess patient's baseline for ADL function and identify physical deficits which impact ability to perform ADLs (bathing, care of mouth/teeth, toileting, grooming, dressing, etc.)  - Assess/evaluate cause of self-care deficits   - Assess range of motion  - Assess patient's mobility; develop plan if impaired  - Assess patient's need for assistive devices and provide as appropriate  - Encourage maximum independence but intervene and supervise when necessary  - Involve family in performance of ADLs  - Assess for home care needs following discharge   - Consider OT consult to assist with ADL evaluation and planning for discharge  - Provide patient education as appropriate  Outcome: Progressing  Goal: Maintains/Returns to pre admission functional level  Description: INTERVENTIONS:  - Perform AM-PAC 6 Click Basic Mobility/ Daily Activity assessment daily.  - Set and communicate daily mobility goal to care team and patient/family/caregiver.   - Collaborate with rehabilitation services on mobility goals if consulted  - Out of bed for toileting  - Record patient progress and toleration of activity level   Outcome: Progressing     Problem: DISCHARGE PLANNING  Goal: Discharge to home or other facility with appropriate resources  Description: INTERVENTIONS:  - Identify barriers to discharge w/patient and caregiver  - Arrange for needed discharge resources and transportation as appropriate  - Identify discharge learning needs (meds, wound care, etc.)  - Arrange for interpretive services to assist at discharge as needed  - Refer to Case Management Department for coordinating discharge planning if the patient needs post-hospital services based on physician/advanced practitioner order or complex needs  related to functional status, cognitive ability, or social support system  Outcome: Progressing     Problem: POSTPARTUM  Goal: Experiences normal postpartum course  Description: INTERVENTIONS:  - Monitor maternal vital signs  - Assess uterine involution and lochia  Outcome: Progressing  Goal: Appropriate maternal -  bonding  Description: INTERVENTIONS:  - Identify family support  - Assess for appropriate maternal/infant bonding   -Encourage maternal/infant bonding opportunities  - Referral to  or  as needed  Outcome: Progressing  Goal: Establishment of infant feeding pattern  Description: INTERVENTIONS:  - Assess breast/bottle feeding  - Refer to lactation as needed  Outcome: Progressing  Goal: Incision(s), wounds(s) or drain site(s) healing without S/S of infection  Description: INTERVENTIONS  - Assess and document dressing, incision, wound bed, drain sites and surrounding tissue  - Provide patient and family education  - Perform skin care/dressing changes every   Outcome: Progressing

## 2024-12-23 ENCOUNTER — RESULTS FOLLOW-UP (OUTPATIENT)
Dept: OBGYN CLINIC | Facility: CLINIC | Age: 39
End: 2024-12-23

## 2024-12-23 ENCOUNTER — TELEPHONE (OUTPATIENT)
Dept: OBGYN CLINIC | Facility: CLINIC | Age: 39
End: 2024-12-23

## 2024-12-23 PROCEDURE — 88307 TISSUE EXAM BY PATHOLOGIST: CPT | Performed by: PATHOLOGY

## 2024-12-23 NOTE — TELEPHONE ENCOUNTER
Placed call to patient for postpartum assessment, unidentified male voice answered the phone and stated Veronica was not present at this time to take call, no alternative contact number as this was a shared phone for self and Veronica, will have Veronica return call has caller ID for number, no details or information provided at this time.

## 2024-12-23 NOTE — UTILIZATION REVIEW
"Admitted 2024  Delivered 2024  2 day labor      NOTIFICATION OF INPATIENT ADMISSION   MATERNITY/DELIVERY AUTHORIZATION REQUEST   SERVICING FACILITY:   [facility] - L&D, , NICU  [address] [place] [address]  Tax ID: [id number]  NPI: [id number]   ATTENDING PROVIDER:  Attending Name and NPI#: Daily Wilkins Md [[id number]]  Address: [address] [place] [address]  Phone: [phone]   ADMISSION INFORMATION:  Place of Service: Inpatient [facility]  Place of Service Code: 21  Inpatient Admission Date/Time: 24  9:31 PM  Discharge Date/Time: 2024  6:03 PM  Admitting Diagnosis Code/Description:  Amniotic fluid leaking [O42.90]  40 weeks gestation of pregnancy [Z3A.40]  Encounter for full-term uncomplicated delivery [O80]     Mother: Veronica Pozo 1985 Estimated Date of Delivery: 24  Delivering clinician: Daily Wilkins   OB History          1    Para   1    Term   1            AB        Living   1         SAB        IAB        Ectopic        Multiple   0    Live Births   1               [place] Name & MRN:   Information for the patient's :  Danilo Garcia [[id number]]   [place] Delivery Information:  Sex: male  Delivered 2024 12:39 AM by Vaginal, Spontaneous; Gestational Age: 41w0d     Measurements:  Weight: 8 lb 1.2 oz (3663 g);  Height: 20.5\"    APGAR 1 minute 5 minutes 10 minutes   Totals: 8 9       UTILIZATION REVIEW CONTACT:  Roxanne Lou Utilization   Network Utilization Review Department  Phone: [phone]  Fax [phone]  Email: [email or web address]  Contact for approvals/pending authorizations, clinical reviews, and discharge.     PHYSICIAN ADVISORY SERVICES:  Medical Necessity Denial & Yfhf-bv-Nycw Review  Phone: [phone]  Fax: [phone]  Email: [email or web address]     DISCHARGE SUPPORT TEAM:  For Patients " Discharge Needs & Updates  Phone: [phone] opt. 2 Fax: [phone]  Email: [email or web address]    Initial Clinical Review    Admission: Date/Time/Statement:   Admission Orders (From admission, onward)       Ordered        12/16/24 2131  Inpatient Admission  Once                          Orders Placed This Encounter   Procedures    Inpatient Admission     Standing Status:   Standing     Number of Occurrences:   1     Level of Care:   Med Surg [16]     Estimated length of stay:   More than 2 Midnights     Certification:   I certify that inpatient services are medically necessary for this patient for a duration of greater than two midnights. See H&P and MD Progress Notes for additional information about the patient's course of treatment.       Chief Complaint   Patient presents with    Laboring     Rule out rupture, rule out rupture. Sent from office       Initial Presentation: 39 y.o. female presented to L&D as inpatient admission for induction of labor for non-reassuring fetal heart tones. G 1 @ 40w 5 d. Plan continuous external monitoring, IVF,cytotec hill balloon, IV pitocin if needed Epidural and IV MGSO 4 if needed and supportive care   SVE: 1/80/-2  Continuous fetal monitoring and tocometry   FHT cat 2. Baseline 150 bpm with moderate variability with presence of 15x15 accelerations, and one variable deceleration noted    12-17-24   @ 0029  Hill balloon inserted  Cervical Dilation: 1  Cervical Effacement: 80  Fetal Station: -2  Fetal Heart Rate (FHT): 150 BPM  FHR Category: 1    @ 0513  IV Pitocin started   Dose (kee-units/min) Oxytocin: 2 kee-units/min  Contraction Frequency (minutes): 3-5  Contraction Intensity: Moderate  Uterine Activity Characteristics: Irregular  Cervical Dilation: 1  Cervical Effacement: 80  Fetal Station: -2  Baseline Rate (FHR): 150 bpm  Fetal Heart Rate (FHT): 148 BPM  FHR Category: 1    @ 0637  Hill balloon dislodged  AROM   Dose (kee-units/min) Oxytocin: 2  kee-units/min  Contraction Frequency (minutes): 2-4.5  Contraction Intensity: Moderate  Uterine Activity Characteristics: Irregular  Cervical Dilation: 5  Cervical Effacement: 80  Fetal Station: -1  Baseline Rate (FHR): 160 bpm  Fetal Heart Rate (FHT): 148 BPM  FHR Category: 1    @ 0800  Dose (kee-units/min) Oxytocin: 4 kee-units/min  Contraction Frequency (minutes): 2-4  Contraction Intensity: Moderate  Uterine Activity Characteristics: Irregular  Cervical Dilation: 6  Cervical Effacement: 90  Fetal Station: -1  Baseline Rate (FHR): 145 bpm  Fetal Heart Rate (FHT): 148 BPM  FHR Category: Category I    @ 1010   Epidural placed     @ 1258   Dose (kee-units/min) Oxytocin: 4 kee-units/min  Contraction Frequency (minutes): 1.5-3  Contraction Intensity: Moderate/Strong  Uterine Activity Characteristics: Irregular  Cervical Dilation: 8  Cervical Effacement: 90  Fetal Station: -1  Baseline Rate (FHR): 150 bpm  Fetal Heart Rate (FHT): 132 BPM  FHR Category: I    @ 1415  Dose (kee-units/min) Oxytocin: 8 kee-units/min  Contraction Frequency (minutes): 2-4  Contraction Intensity: Moderate/Strong  Uterine Activity Characteristics: Irregular  Cervical Dilation: 9  Cervical Effacement: 90  Fetal Station: 0  Baseline Rate (FHR): 155 bpm  Fetal Heart Rate (FHT): 132 BPM  FHR Category: II  FHT: 140's moderate variability, 2 min deceleration after drop in maternal bp, resolved with position change and ephedrine.  Accelerations.    @ 2231  Dose (kee-units/min) Oxytocin: 8 kee-units/min  Contraction Frequency (minutes): 2-3  Contraction Intensity: Moderate  Uterine Activity Characteristics: Irregular  Cervical Dilation: Lip/rim (Comment)  Cervical Effacement: 90  Fetal Station: 0  Baseline Rate (FHR): 160 bpm  Fetal Heart Rate (FHT): 202 BPM  FHR Category: Category i    @ 2353  Dose (kee-units/min) Oxytocin: 8 kee-units/min  Contraction Frequency (minutes): 2-3.5  Contraction Intensity: Moderate  Uterine Activity  Characteristics: Irregular  Cervical Dilation: 10  Dilation Complete Date: 24  Dilation Complete Time: 2347  Cervical Effacement: 100  Fetal Station: 1  Baseline Rate (FHR): 170 bpm  Fetal Heart Rate (FHT): 202 BPM  FHR Category: 2    24  SAVD @ 41 W   MALE  APGAR 8  9  WT 3663 GRAMS  Infant dried suctioned stimulated and warmed. Responded well and taken to NBN     Scheduled Medications:  No current facility-administered medications for this encounter.    Continuous IV Infusions:  No current facility-administered medications for this encounter.    PRN Meds:  No current facility-administered medications for this encounter.    Amitting  Vitals [24 1849]   Temperature Pulse Respirations Blood Pressure SpO2 Pain Score   97.6 °F (36.4 °C) 88 20 110/70 99 % No Pain     Weight (last 2 days) before discharge       Date/Time Weight    24 1347 --    Comment rows:    OBSERV: Dr Wilkins aware of patients HR fluctuation. from 90's -150's. No new orders at this time.pt asymptomatic at 24 1347    24 1033 --    Comment rows:    OBSERV: dr louie aware of pts intermittent increase in HR at 24 1033    24 0855 --    Comment rows:    OBSERV: dr nunez at bedside to discuss plan care with pt due to her pain level at this time. Pt requesting iv pain medication at 24 0855    24 0755 --    Comment rows:    OBSERV: SVE/AROM by dr wilkins at 24 0755    24 0739 69.9 (154)            Vital Signs (last 3 days) before discharge       Date/Time Temp Pulse Resp BP MAP (mmHg) SpO2 O2 Device Cardiac (WDL) Patient Position - Orthostatic VS Pain    24 0945 -- -- -- -- -- -- -- WDL -- --    24 0901 97.9 °F (36.6 °C) 89 16 90/54 -- 99 % -- -- -- 4    24 0554 -- -- -- -- -- -- -- -- -- 2    24 0120 97.8 °F (36.6 °C) 71 18 98/67 -- 98 % None (Room air) -- Lying --    24 -- -- -- -- -- -- -- -- -- 3    24 -- -- -- -- -- -- None (Room air) [facility] --  --    12/18/24 2003 98 °F (36.7 °C) 97 18 97/50 71 98 % None (Room air) -- Lying 4    12/18/24 1720 -- -- -- -- -- -- -- -- -- No Pain    12/18/24 1618 98.2 °F (36.8 °C) 88 18 103/59 77 98 % None (Room air) -- Lying No Pain    12/18/24 1145 98.5 °F (36.9 °C) 81 20 96/51 -- -- -- -- -- 2    12/18/24 0900 -- -- -- -- -- -- None (Room air) WDL -- 2    12/18/24 0756 97.9 °F (36.6 °C) 87 16 88/56 -- 99 % -- -- -- 2    12/18/24 0532 -- -- -- -- -- -- -- -- -- 2    12/18/24 0347 98.2 °F (36.8 °C) 79 16 113/67 -- 98 % None (Room air) WDL Sitting No Pain    12/18/24 0300 -- -- -- -- -- -- -- WDL -- --    12/18/24 0245 -- -- -- 95/52 -- -- -- -- -- --    12/18/24 0230 99.4 °F (37.4 °C) -- -- 102/53 -- -- -- -- -- --    12/18/24 0215 -- -- -- 98/68 -- -- -- -- -- --    12/18/24 0200 -- -- -- 106/91 -- -- -- -- -- --    12/18/24 0148 -- -- -- -- -- -- -- -- -- No Pain    12/18/24 0145 -- -- -- 127/58 -- -- -- -- -- --    12/18/24 0130 -- -- -- 112/53 -- -- -- -- -- --    12/18/24 0115 -- 106 -- 110/59 -- -- -- -- -- No Pain    12/18/24 0025 100.7 °F (38.2 °C) 164 -- 91/51 -- 98 % -- -- -- --    12/18/24 0015 -- 135 -- -- -- -- -- -- -- --    12/17/24 2353 98.8 °F (37.1 °C) 105 -- 117/61 -- -- -- -- -- --    12/17/24 2345 -- -- -- 109/60 -- -- -- -- -- --    12/17/24 2330 -- -- -- 108/63 -- -- -- -- -- --    12/17/24 2315 -- 98 -- 114/58 -- -- -- -- -- --    12/17/24 2300 -- -- -- 110/61 -- -- -- -- -- --    12/17/24 2251 99.8 °F (37.7 °C) 95 -- 108/62 -- -- -- -- -- --    12/17/24 2249 -- 96 -- 112/63 -- -- -- -- -- --    12/17/24 2245 -- 102 -- 78/50 -- -- -- -- -- --    12/17/24 2241 -- 93 -- 82/47 -- -- -- -- -- --    12/17/24 2240 98.4 °F (36.9 °C) -- -- -- -- -- -- -- -- --    12/17/24 2230 -- -- -- 121/57 -- -- -- -- -- --    12/17/24 2215 -- 109 -- 108/58 -- -- -- -- -- --    12/17/24 2200 98.2 °F (36.8 °C) -- -- 108/57 -- -- -- -- -- --    12/17/24 2145 -- -- -- 106/59 -- -- -- -- -- --    12/17/24 2130 -- -- -- 110/61  -- -- -- -- -- --    12/17/24 2115 -- -- -- 109/66 -- -- -- -- -- --    12/17/24 2045 -- -- -- 100/55 -- -- -- -- -- --    12/17/24 2030 -- 106 -- 108/66 -- -- -- -- -- --    12/17/24 2015 -- 94 -- 106/68 -- -- -- -- -- --    12/17/24 2000 -- -- -- 102/53 -- -- -- -- -- --    12/17/24 1945 -- -- -- 105/60 -- -- -- -- -- --    12/17/24 1930 98.5 °F (36.9 °C) -- -- 101/56 -- -- -- -- -- --    12/17/24 1855 -- 96 -- 108/65 -- -- -- -- -- --    12/17/24 1840 -- 100 -- 110/68 -- -- -- -- -- --    12/17/24 1824 -- 110 -- 108/67 -- -- -- -- -- No Pain    12/17/24 1809 -- 100 -- 103/66 -- -- -- -- -- --    12/17/24 1755 -- 106 -- 111/72 -- -- -- -- -- --    12/17/24 1739 -- 107 -- 115/67 -- -- -- -- -- --    12/17/24 1725 -- 106 -- 119/70 -- -- -- -- -- --    12/17/24 1710 99.4 °F (37.4 °C) 125 20 131/66 -- -- -- -- -- No Pain    12/17/24 1641 -- 151 -- 103/69 -- -- -- -- -- --    12/17/24 1609 -- 164 -- 98/60 -- -- -- -- -- --    12/17/24 1600 98.8 °F (37.1 °C) -- -- -- -- -- -- -- -- --    12/17/24 1554 -- 150 -- 109/64 -- -- -- -- -- --    12/17/24 1540 -- 137 -- 111/63 -- -- -- -- -- --    12/17/24 1539 -- 137 -- 111/63 -- -- -- -- -- --    12/17/24 1526 -- 109 -- 108/56 -- -- -- -- -- --    12/17/24 1506 -- 99 -- 98/50 -- -- -- -- -- --    12/17/24 1503 -- 94 -- 107/56 -- -- -- -- -- --    12/17/24 1500 -- 105 -- 105/55 -- -- -- -- -- --    12/17/24 1455 98.7 °F (37.1 °C) 99 20 -- -- -- -- -- -- --    12/17/24 1454 -- 94 -- 105/59 -- -- -- -- -- --    12/17/24 1451 -- 116 -- 96/54 -- -- -- -- -- --    12/17/24 1448 -- 100 -- 95/51 -- -- -- -- -- --    12/17/24 1445 -- 92 -- 93/50 -- -- -- -- -- --    12/17/24 1444 -- 108 -- 106/60 -- -- -- -- -- --    12/17/24 1443 -- 91 -- -- -- -- -- -- -- --    12/17/24 1442 -- 80 -- 118/64 -- -- -- -- -- --    12/17/24 1441 -- 106 -- 120/62 -- -- -- -- -- --    12/17/24 1439 -- 105 -- -- -- -- -- -- -- --    12/17/24 1435 -- 117 -- -- -- -- -- -- -- --    12/17/24 1432 -- 120 --  69/47 -- -- -- -- -- --    12/17/24 1351 98.7 °F (37.1 °C) -- 20 -- -- -- -- -- -- --    12/17/24 1347 -- 150 -- 102/54 -- -- -- -- -- --    OBSERV: Dr Wilkins aware of patients HR fluctuation. from 90's -150's. No new orders at this time.pt asymptomatic at 12/17/24 1347    12/17/24 1250 98.3 °F (36.8 °C) -- -- -- -- -- -- -- -- --    12/17/24 1247 -- 78 -- 81/47 -- -- -- -- -- --    12/17/24 1243 -- 76 -- -- -- -- -- -- -- --    12/17/24 1239 -- 79 -- -- -- -- -- -- -- --    12/17/24 1235 -- 91 -- -- -- -- -- -- -- --    12/17/24 1233 -- 83 -- 98/57 -- -- -- -- -- --    12/17/24 1231 -- 116 -- -- -- -- -- -- -- --    12/17/24 1227 -- 100 -- -- -- -- -- -- -- --    12/17/24 1223 -- 79 -- -- -- -- -- -- -- --    12/17/24 1219 -- 87 -- -- -- -- -- -- -- --    12/17/24 1218 -- 82 -- 100/61 -- -- -- -- -- --    12/17/24 1147 -- 95 -- 100/62 -- -- -- -- -- --    12/17/24 1143 -- 82 -- -- -- -- -- -- -- --    12/17/24 1139 -- 85 -- -- -- -- -- -- -- --    12/17/24 1135 -- 87 -- -- -- -- -- -- -- --    12/17/24 1133 -- 82 -- 111/63 -- -- -- -- -- --    12/17/24 1131 -- 80 -- -- -- -- -- -- -- --    12/17/24 1127 -- 88 -- -- -- -- -- -- -- --    12/17/24 1123 -- 80 -- -- -- -- -- -- -- --    12/17/24 1119 -- 75 -- -- -- -- -- -- -- --    12/17/24 1118 -- 67 -- 107/62 -- -- -- -- -- --    12/17/24 1115 -- 64 -- -- -- -- -- -- -- --    12/17/24 1111 -- 81 -- -- -- -- -- -- -- --    12/17/24 1110 98.5 °F (36.9 °C) 81 -- 95/59 -- -- -- -- -- --    12/17/24 1047 -- 116 -- 99/58 -- -- -- -- -- --    12/17/24 1044 -- 108 -- 108/57 -- -- -- -- -- --    12/17/24 1037 -- 109 -- 95/54 -- -- -- -- -- --    12/17/24 1035 -- 126 -- 84/48 -- -- -- -- -- --    12/17/24 1033 -- 130 -- 107/55 -- -- -- -- -- --    OBSERV: dr louie aware of pts intermittent increase in HR at 12/17/24 1033    12/17/24 1029 -- 89 -- 115/63 -- -- -- -- -- --    12/17/24 1026 -- 71 -- 111/61 -- -- -- -- -- No Pain    12/17/24 1025 -- -- -- -- -- -- -- -- -- No Pain     12/17/24 1024 -- 81 -- -- -- 94 % -- -- -- --    12/17/24 1023 -- 127 20 84/54 -- -- -- -- Lying --    12/17/24 1020 -- 80 -- 103/57 -- -- -- -- -- --    12/17/24 1015 -- 68 -- -- -- -- -- -- -- --    12/17/24 1014 -- 68 -- 111/61 -- 96 % -- -- -- --    12/17/24 1011 -- 86 -- -- -- -- -- -- -- --    12/17/24 1009 -- 77 -- -- -- 94 % -- -- -- --    12/17/24 1007 -- 85 -- -- -- -- -- -- -- --    12/17/24 1003 -- 74 -- -- -- -- -- -- -- --    12/17/24 1000 97.8 °F (36.6 °C) 69 20 114/67 -- -- -- -- -- --    12/17/24 0931 -- 68 -- -- -- -- -- -- -- --    12/17/24 0929 -- 67 -- 117/68 -- -- -- -- -- --    12/17/24 0902 -- -- -- -- -- -- -- -- -- 9    12/17/24 0855 -- -- -- -- -- -- -- -- -- 9    OBSERV: dr nunez at bedside to discuss plan care with pt due to her pain level at this time. Pt requesting iv pain medication at 12/17/24 0855    12/17/24 0845 -- 69 -- 129/62 -- -- -- -- -- --    12/17/24 0815 -- 73 -- 116/69 -- -- -- -- -- --    12/17/24 0800 -- 82 -- 117/68 -- -- -- -- -- --    12/17/24 0755 -- -- -- -- -- -- -- -- -- --    OBSERV: NATALIYA/AROM by dr andujar at 12/17/24 0755    12/17/24 0744 -- 76 -- 106/55 -- -- -- -- -- --    12/17/24 0739 98 °F (36.7 °C) 82 20 117/56 -- -- -- -- -- 7    12/17/24 0700 -- 66 -- 129/63 -- -- -- -- -- --    12/17/24 0636 -- 63 -- 98/51 -- -- -- -- -- --    12/17/24 0630 -- 71 -- 98/51 -- -- -- -- -- --    12/17/24 0615 -- 73 -- 91/53 -- -- -- -- -- --    12/17/24 0600 -- 72 -- 95/52 -- -- -- -- -- --    12/17/24 0545 -- 64 -- 99/56 -- -- -- -- -- --    12/17/24 0530 -- 84 -- 102/54 -- -- -- -- -- --    12/17/24 0515 -- 68 -- 96/55 -- -- -- -- -- --    12/17/24 0500 -- 65 -- 97/57 -- -- -- -- -- --    12/17/24 0445 97.8 °F (36.6 °C) 73 -- 99/60 -- -- -- -- -- --    12/17/24 0430 -- 66 -- 99/58 -- -- -- -- -- --    12/17/24 0415 -- 112 -- 106/65 -- -- -- -- -- --    12/17/24 0405 -- 75 -- 121/74 -- -- -- -- -- --    12/17/24 0400 -- 84 -- -- -- -- -- -- -- --    12/17/24 0345 -- 62  -- 116/71 -- -- -- -- -- --    12/17/24 0330 -- 68 -- 118/71 -- -- -- -- -- --    12/17/24 0315 -- 65 -- 118/65 -- -- -- -- -- --    12/17/24 0300 -- 72 -- 114/64 -- -- -- -- -- --    12/17/24 0245 -- 62 -- 138/79 -- -- -- -- -- --    12/17/24 0206 -- 67 -- 123/79 -- -- -- -- -- --    12/17/24 0030 -- -- -- 117/55 -- -- -- -- -- --    12/16/24 2326 -- -- -- -- -- -- -- -- -- --    OBSERV: pt agrees to stay for iol , emotional support given at 12/16/24 2326 12/16/24 2322 -- -- -- -- -- -- -- -- -- --    OBSERV: Dr Serrano in room to discuss with pt Induction process, pt may want to go home at 12/16/24 2322 12/16/24 1849 97.6 °F (36.4 °C) 88 20 110/70 -- 99 % -- -- Lying No Pain              Pertinent Labs/Diagnostic Test Results:   Radiology:  No orders to display     Cardiology:  ECG 12 lead   Final Result by Krystian Jenkins MD (12/18 0905)   Sinus tachycardia   Nonspecific ST abnormality   Abnormal ECG   No previous ECGs available   Confirmed by Krystian Jenkins ([id number]) on 12/18/2024 9:05:31 AM              Results from last 7 days   Lab Units 12/16/24  2354   WBC Thousand/uL 9.56   HEMOGLOBIN g/dL 12.0   HEMATOCRIT % 36.1   PLATELETS Thousands/uL 247   TOTAL NEUT ABS Thousands/µL 7.09               Results from last 7 days   Lab Units 12/16/24  2354   HEP B S AG  Non-reactive   HEP C AB  Non-reactive           Past Medical History:   Diagnosis Date    Migraine     occas , non with this pregnancy    Varicella     pt had chickenpox childhodd and also vaccine     Present on Admission:   Non-reassuring electronic fetal monitoring tracing      Admitting Diagnosis: Amniotic fluid leaking [O42.90]  40 weeks gestation of pregnancy [Z3A.40]  Encounter for full-term uncomplicated delivery [O80]  Age/Sex: 39 y.o. female    Network Utilization Review Department  ATTENTION: Please call with any questions or concerns to [phone] and carefully listen to the prompts so that you are directed to the right person. All voicemails  are confidential.   For Discharge needs, contact Care Management DC Support Team at [phone] opt. [phone]  Send all requests for admission clinical reviews, approved or denied determinations and any other requests to dedicated fax number below belonging to the campus where the patient is receiving treatment. List of dedicated fax numbers for the Facilities:  FACILITY NAME UR FAX NUMBER   ADMISSION DENIALS (Administrative/Medical Necessity) [phone]   DISCHARGE SUPPORT TEAM (NETWORK) [phone]   PARENT CHILD HEALTH (Maternity/NICU/Pediatrics) [phone]   [facility] [phone]   [facility] [phone]   [facility] [phone]   [facility] [phone]   [facility] [phone]   [facility] [phone]   [facility] [phone]   [facility] [phone]   [facility] [phone]   [facility] [phone]   [facility] [phone]   [facility] [phone]

## 2024-12-24 ENCOUNTER — TELEPHONE (OUTPATIENT)
Age: 39
End: 2024-12-24

## 2025-01-17 ENCOUNTER — POSTPARTUM VISIT (OUTPATIENT)
Dept: OBGYN CLINIC | Facility: CLINIC | Age: 40
End: 2025-01-17

## 2025-01-17 VITALS
SYSTOLIC BLOOD PRESSURE: 116 MMHG | BODY MASS INDEX: 22.53 KG/M2 | HEIGHT: 64 IN | WEIGHT: 132 LBS | DIASTOLIC BLOOD PRESSURE: 72 MMHG

## 2025-01-17 PROCEDURE — 99024 POSTOP FOLLOW-UP VISIT: CPT | Performed by: OBSTETRICS & GYNECOLOGY

## 2025-01-17 NOTE — PROGRESS NOTES
Subjective     Veronica Pozo is a 39 y.o. G1, P1 female here for a postpartum visit. She is 4 weeks post partum following a spontaneous vaginal delivery. I have fully reviewed the prenatal and intrapartum course.  We also discussed her placental pathology.  The delivery was at 41 gestational weeks. Outcome: .  Anesthesia: epidural.  Postpartum course has been uncomplicated.  Baby's course has been doing well without problems. Baby is feeding breast and bottle with formula due to insufficient supply.   Discussed reviewed and counseled patient is aware of options to improve supply as needed generally she feels breasts are tolerating activity well.  Patient is tolerating regular diet, Bleeding no bleeding.  Bowel function is normal. Bladder function is  generally normal rare episodes of stress urine incontinence .  Patient did have an episode of pressure when she ran across the street and rested more and this resolved.  We discussed pelvic support and recovery of pelvic support and Kegel's exercises which patient will work on.  Patient is aware of option for pelvic physical therapy and will contact me if desires referral.  Patient is not sexually active. Contraception method is  none for now will use condoms or spermicide's or rhythm as needed . Postpartum depression screening: negative  EPDS 2 currently patient would like to wait over a year but would consider conception after that time.     Gynecologic History  Patient's last menstrual period was 2024 (exact date).  Contraception: none, condoms as needed  Last Pap: 2024. Results were: normal      Obstetric History  OB History    Para Term  AB Living   1 1 1   1   SAB IAB Ectopic Multiple Live Births      0 1      # Outcome Date GA Lbr Marcos/2nd Weight Sex Type Anes PTL Lv   1 Term 24 41w0d / 00:52 3663 g (8 lb 1.2 oz) M Vag-Spont EPI N ALMA DELIA         The following portions of the patient's history were reviewed and updated as appropriate:  "allergies, current medications, past family history, past medical history, past social history, past surgical history, and problem list.    Review of Systems  Review of Systems     Objective     /72 (BP Location: Left arm, Patient Position: Sitting, Cuff Size: Standard)   Ht 5' 4\" (1.626 m)   Wt 59.9 kg (132 lb)   LMP 03/11/2024 (Exact Date)   Breastfeeding Yes Comment: breast and bottle  BMI 22.66 kg/m²   General appearance: alert and oriented, in no acute distress  Head: Normocephalic, without obvious abnormality, atraumatic  Lungs: clear to auscultation bilaterally  Heart: regular rate and rhythm, S1, S2 normal, no murmur, click, rub or gallop  Abdomen: soft, non-tender; bowel sounds normal; no masses,  no organomegaly  Pelvic: external genitalia normal, vagina normal without discharge, uterus normal size, shape, and consistency, no cervical motion tenderness, no adnexal masses or tenderness, and repair site healing well, nontender discussed with patient Danni 1 out of 5 bilaterally patient is still learning to isolate those muscles  Extremities: extremities normal, warm and well-perfused; no cyanosis, clubbing, or edema      Assessment  39-year-old G1, P1 4 weeks postpartum steadily recovering.  Aware of option for referral to pelvic physical therapy patient will work on [employer].  Breast and bottlefeeding generally recovering well.  Encourage patient to reach out with any questions or concerns and based increasing physical activity on pelvic pressure and feedback from her body.     Plan  Return in October for annual or sooner as needed    "